# Patient Record
Sex: FEMALE | Race: WHITE | NOT HISPANIC OR LATINO | Employment: FULL TIME | URBAN - NONMETROPOLITAN AREA
[De-identification: names, ages, dates, MRNs, and addresses within clinical notes are randomized per-mention and may not be internally consistent; named-entity substitution may affect disease eponyms.]

---

## 2017-04-17 LAB
EXTERNAL ABO GROUPING: NORMAL
EXTERNAL ANTIBODY SCREEN: NEGATIVE
EXTERNAL HEPATITIS B SURFACE ANTIGEN: NEGATIVE
EXTERNAL RH FACTOR: POSITIVE
EXTERNAL RUBELLA QUALITATIVE: NORMAL
EXTERNAL SYPHILIS RPR SCREEN: NORMAL
HIV1 P24 AG SERPL QL IA: NORMAL

## 2017-11-13 LAB — EXTERNAL GROUP B STREP ANTIGEN: NORMAL

## 2017-12-11 RX ORDER — ACETAMINOPHEN 325 MG/1
650 TABLET ORAL EVERY 4 HOURS PRN
Status: CANCELLED | OUTPATIENT
Start: 2017-12-11

## 2017-12-11 RX ORDER — ZOLPIDEM TARTRATE 5 MG/1
5 TABLET ORAL NIGHTLY PRN
Status: CANCELLED | OUTPATIENT
Start: 2017-12-11 | End: 2017-12-21

## 2017-12-11 RX ORDER — LIDOCAINE HYDROCHLORIDE 10 MG/ML
5 INJECTION, SOLUTION INFILTRATION; PERINEURAL AS NEEDED
Status: CANCELLED | OUTPATIENT
Start: 2017-12-11

## 2017-12-11 RX ORDER — SODIUM CHLORIDE 0.9 % (FLUSH) 0.9 %
1-10 SYRINGE (ML) INJECTION AS NEEDED
Status: CANCELLED | OUTPATIENT
Start: 2017-12-11

## 2017-12-12 ENCOUNTER — ANESTHESIA EVENT (OUTPATIENT)
Dept: LABOR AND DELIVERY | Facility: HOSPITAL | Age: 32
End: 2017-12-12

## 2017-12-12 ENCOUNTER — HOSPITAL ENCOUNTER (INPATIENT)
Facility: HOSPITAL | Age: 32
LOS: 2 days | Discharge: HOME | End: 2017-12-14
Attending: OBSTETRICS & GYNECOLOGY | Admitting: OBSTETRICS & GYNECOLOGY

## 2017-12-12 ENCOUNTER — ANESTHESIA (OUTPATIENT)
Dept: LABOR AND DELIVERY | Facility: HOSPITAL | Age: 32
End: 2017-12-12

## 2017-12-12 LAB
ABO GROUP BLD: NORMAL
BLD GP AB SCN SERPL QL: NEGATIVE
DEPRECATED RDW RBC AUTO: 45.9 FL (ref 40–54)
ERYTHROCYTE [DISTWIDTH] IN BLOOD BY AUTOMATED COUNT: 14.1 % (ref 12–15)
HCT VFR BLD AUTO: 35.7 % (ref 37–47)
HGB BLD-MCNC: 12 G/DL (ref 12–16)
MCH RBC QN AUTO: 30 PG (ref 28–32)
MCHC RBC AUTO-ENTMCNC: 33.6 G/DL (ref 33–36)
MCV RBC AUTO: 89.3 FL (ref 82–98)
PLATELET # BLD AUTO: 174 10*3/MM3 (ref 130–400)
PMV BLD AUTO: 10.9 FL (ref 6–12)
RBC # BLD AUTO: 4 10*6/MM3 (ref 4.2–5.4)
RH BLD: POSITIVE
WBC NRBC COR # BLD: 11.78 10*3/MM3 (ref 4.8–10.8)

## 2017-12-12 PROCEDURE — 86900 BLOOD TYPING SEROLOGIC ABO: CPT | Performed by: OBSTETRICS & GYNECOLOGY

## 2017-12-12 PROCEDURE — 86850 RBC ANTIBODY SCREEN: CPT | Performed by: OBSTETRICS & GYNECOLOGY

## 2017-12-12 PROCEDURE — C1765 ADHESION BARRIER: HCPCS | Performed by: OBSTETRICS & GYNECOLOGY

## 2017-12-12 PROCEDURE — 25010000002 ONDANSETRON PER 1 MG: Performed by: NURSE ANESTHETIST, CERTIFIED REGISTERED

## 2017-12-12 PROCEDURE — 25010000002 HYDROMORPHONE PER 4 MG: Performed by: NURSE ANESTHETIST, CERTIFIED REGISTERED

## 2017-12-12 PROCEDURE — 63710000001 PROMETHAZINE PER 25 MG: Performed by: OBSTETRICS & GYNECOLOGY

## 2017-12-12 PROCEDURE — 51703 INSERT BLADDER CATH COMPLEX: CPT

## 2017-12-12 PROCEDURE — 0UN90ZZ RELEASE UTERUS, OPEN APPROACH: ICD-10-PCS | Performed by: OBSTETRICS & GYNECOLOGY

## 2017-12-12 PROCEDURE — 86901 BLOOD TYPING SEROLOGIC RH(D): CPT | Performed by: OBSTETRICS & GYNECOLOGY

## 2017-12-12 PROCEDURE — 25010000002 DEXAMETHASONE PER 1 MG: Performed by: NURSE ANESTHETIST, CERTIFIED REGISTERED

## 2017-12-12 PROCEDURE — 25010000002 FUROSEMIDE PER 20 MG: Performed by: OBSTETRICS & GYNECOLOGY

## 2017-12-12 PROCEDURE — 25010000002 KETOROLAC TROMETHAMINE PER 15 MG: Performed by: OBSTETRICS & GYNECOLOGY

## 2017-12-12 PROCEDURE — 25010000003 CEFAZOLIN IN DEXTROSE 2-4 GM/100ML-% SOLUTION: Performed by: OBSTETRICS & GYNECOLOGY

## 2017-12-12 PROCEDURE — 94799 UNLISTED PULMONARY SVC/PX: CPT

## 2017-12-12 PROCEDURE — 85027 COMPLETE CBC AUTOMATED: CPT | Performed by: OBSTETRICS & GYNECOLOGY

## 2017-12-12 RX ORDER — TRISODIUM CITRATE DIHYDRATE AND CITRIC ACID MONOHYDRATE 500; 334 MG/5ML; MG/5ML
30 SOLUTION ORAL ONCE
Status: COMPLETED | OUTPATIENT
Start: 2017-12-12 | End: 2017-12-12

## 2017-12-12 RX ORDER — PROMETHAZINE HYDROCHLORIDE 25 MG/ML
12.5 INJECTION, SOLUTION INTRAMUSCULAR; INTRAVENOUS EVERY 6 HOURS PRN
Status: DISCONTINUED | OUTPATIENT
Start: 2017-12-12 | End: 2017-12-14 | Stop reason: HOSPADM

## 2017-12-12 RX ORDER — PROMETHAZINE HYDROCHLORIDE 12.5 MG/1
12.5 SUPPOSITORY RECTAL EVERY 6 HOURS PRN
Status: DISCONTINUED | OUTPATIENT
Start: 2017-12-12 | End: 2017-12-14 | Stop reason: HOSPADM

## 2017-12-12 RX ORDER — CALCIUM CARBONATE 200(500)MG
2 TABLET,CHEWABLE ORAL EVERY 6 HOURS PRN
Status: DISCONTINUED | OUTPATIENT
Start: 2017-12-12 | End: 2017-12-14 | Stop reason: HOSPADM

## 2017-12-12 RX ORDER — ACETAMINOPHEN 325 MG/1
650 TABLET ORAL EVERY 4 HOURS PRN
Status: DISCONTINUED | OUTPATIENT
Start: 2017-12-12 | End: 2017-12-14 | Stop reason: HOSPADM

## 2017-12-12 RX ORDER — IBUPROFEN 200 MG
600 TABLET ORAL EVERY 8 HOURS PRN
Status: DISCONTINUED | OUTPATIENT
Start: 2017-12-12 | End: 2017-12-13

## 2017-12-12 RX ORDER — METHYLERGONOVINE MALEATE 0.2 MG/ML
200 INJECTION INTRAVENOUS ONCE
Status: DISCONTINUED | OUTPATIENT
Start: 2017-12-12 | End: 2017-12-13

## 2017-12-12 RX ORDER — ONDANSETRON 2 MG/ML
4 INJECTION INTRAMUSCULAR; INTRAVENOUS EVERY 6 HOURS PRN
Status: DISCONTINUED | OUTPATIENT
Start: 2017-12-12 | End: 2017-12-14 | Stop reason: HOSPADM

## 2017-12-12 RX ORDER — ZOLPIDEM TARTRATE 5 MG/1
5 TABLET ORAL NIGHTLY PRN
Status: DISCONTINUED | OUTPATIENT
Start: 2017-12-12 | End: 2017-12-12 | Stop reason: HOSPADM

## 2017-12-12 RX ORDER — FUROSEMIDE 10 MG/ML
40 INJECTION INTRAMUSCULAR; INTRAVENOUS ONCE
Status: COMPLETED | OUTPATIENT
Start: 2017-12-12 | End: 2017-12-12

## 2017-12-12 RX ORDER — CARBOPROST TROMETHAMINE 250 UG/ML
250 INJECTION, SOLUTION INTRAMUSCULAR ONCE
Status: DISCONTINUED | OUTPATIENT
Start: 2017-12-12 | End: 2017-12-13

## 2017-12-12 RX ORDER — OXYCODONE AND ACETAMINOPHEN 7.5; 325 MG/1; MG/1
2 TABLET ORAL EVERY 4 HOURS PRN
Status: DISCONTINUED | OUTPATIENT
Start: 2017-12-12 | End: 2017-12-14 | Stop reason: HOSPADM

## 2017-12-12 RX ORDER — OXYCODONE HYDROCHLORIDE AND ACETAMINOPHEN 5; 325 MG/1; MG/1
1 TABLET ORAL EVERY 4 HOURS PRN
Status: DISCONTINUED | OUTPATIENT
Start: 2017-12-13 | End: 2017-12-13

## 2017-12-12 RX ORDER — LIDOCAINE HYDROCHLORIDE 10 MG/ML
5 INJECTION, SOLUTION INFILTRATION; PERINEURAL AS NEEDED
Status: DISCONTINUED | OUTPATIENT
Start: 2017-12-12 | End: 2017-12-12 | Stop reason: HOSPADM

## 2017-12-12 RX ORDER — SODIUM CHLORIDE, SODIUM LACTATE, POTASSIUM CHLORIDE, CALCIUM CHLORIDE 600; 310; 30; 20 MG/100ML; MG/100ML; MG/100ML; MG/100ML
125 INJECTION, SOLUTION INTRAVENOUS CONTINUOUS
Status: DISCONTINUED | OUTPATIENT
Start: 2017-12-12 | End: 2017-12-12 | Stop reason: SDUPTHER

## 2017-12-12 RX ORDER — SODIUM CHLORIDE, SODIUM LACTATE, POTASSIUM CHLORIDE, CALCIUM CHLORIDE 600; 310; 30; 20 MG/100ML; MG/100ML; MG/100ML; MG/100ML
125 INJECTION, SOLUTION INTRAVENOUS CONTINUOUS
Status: DISCONTINUED | OUTPATIENT
Start: 2017-12-12 | End: 2017-12-13

## 2017-12-12 RX ORDER — PROMETHAZINE HYDROCHLORIDE 25 MG/1
25 TABLET ORAL EVERY 6 HOURS PRN
Status: DISCONTINUED | OUTPATIENT
Start: 2017-12-12 | End: 2017-12-14 | Stop reason: HOSPADM

## 2017-12-12 RX ORDER — SIMETHICONE 80 MG
80 TABLET,CHEWABLE ORAL 4 TIMES DAILY PRN
Status: DISCONTINUED | OUTPATIENT
Start: 2017-12-12 | End: 2017-12-14 | Stop reason: HOSPADM

## 2017-12-12 RX ORDER — EPHEDRINE SULFATE 50 MG/ML
5 INJECTION, SOLUTION INTRAVENOUS
Status: DISCONTINUED | OUTPATIENT
Start: 2017-12-12 | End: 2017-12-12 | Stop reason: HOSPADM

## 2017-12-12 RX ORDER — DEXAMETHASONE SODIUM PHOSPHATE 4 MG/ML
INJECTION, SOLUTION INTRA-ARTICULAR; INTRALESIONAL; INTRAMUSCULAR; INTRAVENOUS; SOFT TISSUE AS NEEDED
Status: DISCONTINUED | OUTPATIENT
Start: 2017-12-12 | End: 2017-12-12 | Stop reason: SURG

## 2017-12-12 RX ORDER — KETOROLAC TROMETHAMINE 30 MG/ML
30 INJECTION, SOLUTION INTRAMUSCULAR; INTRAVENOUS EVERY 6 HOURS PRN
Status: DISPENSED | OUTPATIENT
Start: 2017-12-12 | End: 2017-12-13

## 2017-12-12 RX ORDER — OXYCODONE AND ACETAMINOPHEN 10; 325 MG/1; MG/1
1 TABLET ORAL EVERY 4 HOURS PRN
Status: DISCONTINUED | OUTPATIENT
Start: 2017-12-13 | End: 2017-12-13

## 2017-12-12 RX ORDER — HYDROMORPHONE HCL 110MG/55ML
2 PATIENT CONTROLLED ANALGESIA SYRINGE INTRAVENOUS EVERY 4 HOURS PRN
Status: DISCONTINUED | OUTPATIENT
Start: 2017-12-13 | End: 2017-12-14 | Stop reason: HOSPADM

## 2017-12-12 RX ORDER — DIPHENHYDRAMINE HCL 25 MG
25 CAPSULE ORAL EVERY 4 HOURS PRN
Status: DISCONTINUED | OUTPATIENT
Start: 2017-12-12 | End: 2017-12-14 | Stop reason: HOSPADM

## 2017-12-12 RX ORDER — ONDANSETRON 2 MG/ML
INJECTION INTRAMUSCULAR; INTRAVENOUS AS NEEDED
Status: DISCONTINUED | OUTPATIENT
Start: 2017-12-12 | End: 2017-12-12 | Stop reason: SURG

## 2017-12-12 RX ORDER — FAMOTIDINE 10 MG/ML
20 INJECTION, SOLUTION INTRAVENOUS ONCE AS NEEDED
Status: COMPLETED | OUTPATIENT
Start: 2017-12-12 | End: 2017-12-12

## 2017-12-12 RX ORDER — MISOPROSTOL 200 UG/1
600 TABLET ORAL ONCE
Status: DISCONTINUED | OUTPATIENT
Start: 2017-12-12 | End: 2017-12-13

## 2017-12-12 RX ORDER — NALBUPHINE HCL 10 MG/ML
2.5 AMPUL (ML) INJECTION EVERY 4 HOURS PRN
Status: ACTIVE | OUTPATIENT
Start: 2017-12-12 | End: 2017-12-13

## 2017-12-12 RX ORDER — PRENATAL VIT/IRON FUM/FOLIC AC 27MG-0.8MG
1 TABLET ORAL DAILY
Status: DISCONTINUED | OUTPATIENT
Start: 2017-12-12 | End: 2017-12-14 | Stop reason: HOSPADM

## 2017-12-12 RX ORDER — CEFAZOLIN SODIUM 2 G/100ML
2 INJECTION, SOLUTION INTRAVENOUS ONCE
Status: COMPLETED | OUTPATIENT
Start: 2017-12-12 | End: 2017-12-12

## 2017-12-12 RX ORDER — OXYTOCIN/RINGER'S LACTATE 20/1000 ML
999 PLASTIC BAG, INJECTION (ML) INTRAVENOUS CONTINUOUS
Status: DISPENSED | OUTPATIENT
Start: 2017-12-12 | End: 2017-12-12

## 2017-12-12 RX ORDER — DOCUSATE SODIUM 100 MG/1
100 CAPSULE, LIQUID FILLED ORAL 2 TIMES DAILY PRN
Status: DISCONTINUED | OUTPATIENT
Start: 2017-12-12 | End: 2017-12-14 | Stop reason: HOSPADM

## 2017-12-12 RX ORDER — ACETAMINOPHEN 325 MG/1
650 TABLET ORAL EVERY 4 HOURS PRN
Status: DISCONTINUED | OUTPATIENT
Start: 2017-12-12 | End: 2017-12-12 | Stop reason: HOSPADM

## 2017-12-12 RX ORDER — IBUPROFEN 800 MG/1
800 TABLET ORAL EVERY 6 HOURS PRN
Status: DISCONTINUED | OUTPATIENT
Start: 2017-12-12 | End: 2017-12-14 | Stop reason: HOSPADM

## 2017-12-12 RX ORDER — SODIUM CHLORIDE 0.9 % (FLUSH) 0.9 %
1-10 SYRINGE (ML) INJECTION AS NEEDED
Status: DISCONTINUED | OUTPATIENT
Start: 2017-12-12 | End: 2017-12-12 | Stop reason: HOSPADM

## 2017-12-12 RX ORDER — BUPIVACAINE HYDROCHLORIDE 7.5 MG/ML
INJECTION, SOLUTION EPIDURAL; RETROBULBAR AS NEEDED
Status: DISCONTINUED | OUTPATIENT
Start: 2017-12-12 | End: 2017-12-12 | Stop reason: SURG

## 2017-12-12 RX ORDER — PRENATAL VIT NO.126/IRON/FOLIC 28MG-0.8MG
TABLET ORAL DAILY
COMMUNITY
End: 2020-03-10

## 2017-12-12 RX ORDER — ONDANSETRON 4 MG/1
4 TABLET, FILM COATED ORAL EVERY 8 HOURS PRN
Status: DISCONTINUED | OUTPATIENT
Start: 2017-12-12 | End: 2017-12-14 | Stop reason: HOSPADM

## 2017-12-12 RX ORDER — NALOXONE HCL 0.4 MG/ML
0.4 VIAL (ML) INJECTION
Status: ACTIVE | OUTPATIENT
Start: 2017-12-12 | End: 2017-12-13

## 2017-12-12 RX ORDER — CYCLOBENZAPRINE HCL 10 MG
10 TABLET ORAL 3 TIMES DAILY
Status: DISCONTINUED | OUTPATIENT
Start: 2017-12-12 | End: 2017-12-14 | Stop reason: HOSPADM

## 2017-12-12 RX ORDER — OXYCODONE AND ACETAMINOPHEN 7.5; 325 MG/1; MG/1
1 TABLET ORAL EVERY 4 HOURS PRN
Status: DISCONTINUED | OUTPATIENT
Start: 2017-12-12 | End: 2017-12-14 | Stop reason: HOSPADM

## 2017-12-12 RX ORDER — NALOXONE HCL 0.4 MG/ML
0.1 VIAL (ML) INJECTION
Status: DISCONTINUED | OUTPATIENT
Start: 2017-12-12 | End: 2017-12-14 | Stop reason: HOSPADM

## 2017-12-12 RX ORDER — BUTORPHANOL TARTRATE 1 MG/ML
0.5 INJECTION, SOLUTION INTRAMUSCULAR; INTRAVENOUS EVERY 6 HOURS PRN
Status: DISCONTINUED | OUTPATIENT
Start: 2017-12-12 | End: 2017-12-13

## 2017-12-12 RX ADMIN — CYCLOBENZAPRINE HYDROCHLORIDE 10 MG: 10 TABLET, FILM COATED ORAL at 21:19

## 2017-12-12 RX ADMIN — SODIUM CHLORIDE, POTASSIUM CHLORIDE, SODIUM LACTATE AND CALCIUM CHLORIDE 125 ML/HR: 600; 310; 30; 20 INJECTION, SOLUTION INTRAVENOUS at 14:34

## 2017-12-12 RX ADMIN — OXYTOCIN: 10 INJECTION INTRAVENOUS at 10:56

## 2017-12-12 RX ADMIN — HYDROMORPHONE HYDROCHLORIDE 100 MCG: 1 INJECTION, SOLUTION INTRAMUSCULAR; INTRAVENOUS; SUBCUTANEOUS at 10:07

## 2017-12-12 RX ADMIN — SODIUM CHLORIDE, POTASSIUM CHLORIDE, SODIUM LACTATE AND CALCIUM CHLORIDE: 600; 310; 30; 20 INJECTION, SOLUTION INTRAVENOUS at 10:33

## 2017-12-12 RX ADMIN — BUPIVACAINE HYDROCHLORIDE 1.6 ML: 7.5 INJECTION, SOLUTION EPIDURAL; RETROBULBAR at 10:07

## 2017-12-12 RX ADMIN — SODIUM CITRATE AND CITRIC ACID MONOHYDRATE 30 ML: 500; 334 SOLUTION ORAL at 09:54

## 2017-12-12 RX ADMIN — KETOROLAC TROMETHAMINE 30 MG: 30 INJECTION, SOLUTION INTRAMUSCULAR at 21:19

## 2017-12-12 RX ADMIN — SODIUM CHLORIDE, POTASSIUM CHLORIDE, SODIUM LACTATE AND CALCIUM CHLORIDE: 600; 310; 30; 20 INJECTION, SOLUTION INTRAVENOUS at 11:04

## 2017-12-12 RX ADMIN — OXYTOCIN 0.75 UNITS/MIN: 10 INJECTION INTRAVENOUS at 10:33

## 2017-12-12 RX ADMIN — FUROSEMIDE 40 MG: 10 INJECTION, SOLUTION INTRAMUSCULAR; INTRAVENOUS at 21:20

## 2017-12-12 RX ADMIN — SODIUM CHLORIDE, POTASSIUM CHLORIDE, SODIUM LACTATE AND CALCIUM CHLORIDE 1000 ML: 600; 310; 30; 20 INJECTION, SOLUTION INTRAVENOUS at 07:28

## 2017-12-12 RX ADMIN — FAMOTIDINE 20 MG: 10 INJECTION INTRAVENOUS at 09:53

## 2017-12-12 RX ADMIN — ONDANSETRON HYDROCHLORIDE 8 MG: 2 SOLUTION INTRAMUSCULAR; INTRAVENOUS at 10:10

## 2017-12-12 RX ADMIN — CEFAZOLIN SODIUM 2 G: 2 INJECTION, SOLUTION INTRAVENOUS at 10:10

## 2017-12-12 RX ADMIN — SODIUM CHLORIDE, POTASSIUM CHLORIDE, SODIUM LACTATE AND CALCIUM CHLORIDE 125 ML/HR: 600; 310; 30; 20 INJECTION, SOLUTION INTRAVENOUS at 22:30

## 2017-12-12 RX ADMIN — DEXAMETHASONE SODIUM PHOSPHATE 8 MG: 4 INJECTION, SOLUTION INTRAMUSCULAR; INTRAVENOUS at 10:10

## 2017-12-12 RX ADMIN — SODIUM CHLORIDE, POTASSIUM CHLORIDE, SODIUM LACTATE AND CALCIUM CHLORIDE 125 ML/HR: 600; 310; 30; 20 INJECTION, SOLUTION INTRAVENOUS at 07:57

## 2017-12-12 RX ADMIN — PROMETHAZINE HYDROCHLORIDE 25 MG: 25 TABLET ORAL at 14:34

## 2017-12-12 NOTE — H&P (VIEW-ONLY)
"      Patient Care Team:  Marcello Larson MD as PCP - General (Family Medicine)          History of Present Illness   Patient presents at term gestation for repeat C/S      Review of Systems   Constitutional: Negative.    HENT: Negative.    Eyes: Negative.    Respiratory: Negative.    Cardiovascular: Negative.    Gastrointestinal: Negative.    Endocrine: Negative.    Genitourinary: Negative.    Musculoskeletal: Negative.    Skin: Negative.    Allergic/Immunologic: Negative.    Neurological: Negative.    Hematological: Negative.    Psychiatric/Behavioral: Negative.         No past medical history on file.  Past Surgical History:   Procedure Laterality Date   •  SECTION     • FOOT SURGERY      left     No family history on file.  Social History   Substance Use Topics   • Smoking status: Never Smoker   • Smokeless tobacco: Not on file   • Alcohol use Yes      Comment: occ   Family:  HTN  Meds:  PNV    (Not in a hospital admission)  Allergies:  Review of patient's allergies indicates no known allergies.    Objective      Vital Signs  BP: 130/80  P:  80  R:  15  Ht:  66\"  Wt:  181    Physical Exam   Constitutional: She is oriented to person, place, and time. She appears well-developed and well-nourished.   HENT:   Head: Normocephalic.   Eyes: EOM are normal.   Neck: Normal range of motion. Neck supple.   Cardiovascular: Normal rate, regular rhythm and intact distal pulses.    Pulmonary/Chest: Effort normal and breath sounds normal.   Abdominal: Soft. Bowel sounds are normal.   Genitourinary: Vagina normal.   Genitourinary Comments: S=D  Bimanual:  C/thick/-3     Musculoskeletal: Normal range of motion. She exhibits edema.   Neurological: She is alert and oriented to person, place, and time. She has normal reflexes.   Skin: Skin is warm.   Psychiatric: She has a normal mood and affect. Her behavior is normal.   Vitals reviewed.        Assessment & Plan   Assessment:  1.  Term pregnancy  2.  History Prior " C/S    Plan:  1.  Repeat C/S      Kelly Melédnez DO  12/11/17  6:16 PM

## 2017-12-12 NOTE — PLAN OF CARE
Problem: Patient Care Overview (Adult)  Goal: Plan of Care Review  Outcome: Ongoing (interventions implemented as appropriate)    17 1311   Coping/Psychosocial Response Interventions   Plan Of Care Reviewed With patient   Patient Care Overview   Progress improving   Outcome Evaluation   Outcome Summary/Follow up Plan pt now  with 3 LC. pt incision is CDI. pt can move legs and has pain 1/10. pt has minimal bleeding       Goal: Adult Individualization and Mutuality  Outcome: Ongoing (interventions implemented as appropriate)  Goal: Discharge Needs Assessment  Outcome: Ongoing (interventions implemented as appropriate)    Problem: Postpartum ( Delivery) (Adult)  Goal: Signs and Symptoms of Listed Potential Problems Will be Absent or Manageable (Postpartum)  Outcome: Ongoing (interventions implemented as appropriate)    Problem: Anesthesia/Analgesia, Neuraxial (Obstetrics)  Goal: Signs and Symptoms of Listed Potential Problems Will be Absent or Manageable (Anesthesia/Analgesia, Neuraxial)  Outcome: Ongoing (interventions implemented as appropriate)

## 2017-12-12 NOTE — LACTATION NOTE
"This note was copied from a baby's chart.  40 1/7 week infant delivered 17 @ 1033. Birth weight 7 lb 12.5 oz (3530 g). Mother  her twins and states this baby is \"a natural.\" He latched on independently and is actively nursing without assistance. Reviewed initial breastfeeding packet and mother became nauseated. Father at bedside and supportive. Breastfeeding book given but not reviewed at this time. Mother states she has a new pump from insurance.     Maternal Hx: , C-sec  Maternal Meds: PNV  Pump: New double electric breast pump  "

## 2017-12-12 NOTE — ANESTHESIA PROCEDURE NOTES
Spinal Block    Patient location during procedure: OR  Start Time: 12/12/2017 10:07 AM  Stop Time: 12/12/2017 10:07 AM  Indication:at surgeon's request  Performed By  CRNA: TYESHA EM  Preanesthetic Checklist  Completed: patient identified, site marked, surgical consent, pre-op evaluation, timeout performed, IV checked, risks and benefits discussed and monitors and equipment checked  Spinal Block Prep:  Patient Position:sitting  Sterile Tech:cap, gloves and mask  Prep:Chloraprep  Patient Monitoring:blood pressure monitoring, continuous pulse oximetry and EKG  Spinal Block Procedure  Approach:midline  Location:L4-L5  Needle Type:Pencan  Needle Gauge:25 G  Placement of Spinal needle event:cerebrospinal fluid aspirated    Fluid Appearance:clear  Post Assessment  Patient Tolerance:patient tolerated the procedure well with no apparent complications  Complications no

## 2017-12-12 NOTE — PLAN OF CARE
Problem: Patient Care Overview (Adult)  Goal: Plan of Care Review  Outcome: Ongoing (interventions implemented as appropriate)    17 3121   Coping/Psychosocial Response Interventions   Plan Of Care Reviewed With patient   Patient Care Overview   Progress improving   Outcome Evaluation   Outcome Summary/Follow up Plan Fundus FML U1 scant lochia, Incision dry and intact with adhesive skin glue, FC draining yellow urine, Rates pain a 3.        Goal: Adult Individualization and Mutuality  Outcome: Ongoing (interventions implemented as appropriate)  Goal: Discharge Needs Assessment  Outcome: Ongoing (interventions implemented as appropriate)    Problem: Postpartum ( Delivery) (Adult)  Goal: Signs and Symptoms of Listed Potential Problems Will be Absent or Manageable (Postpartum)  Outcome: Ongoing (interventions implemented as appropriate)    Problem: Anesthesia/Analgesia, Neuraxial (Obstetrics)  Goal: Signs and Symptoms of Listed Potential Problems Will be Absent or Manageable (Anesthesia/Analgesia, Neuraxial)  Outcome: Ongoing (interventions implemented as appropriate)

## 2017-12-12 NOTE — H&P
"      Patient Care Team:  Marcello Larson MD as PCP - General (Family Medicine)          History of Present Illness   Patient presents at term gestation for repeat C/S      Review of Systems   Constitutional: Negative.    HENT: Negative.    Eyes: Negative.    Respiratory: Negative.    Cardiovascular: Negative.    Gastrointestinal: Negative.    Endocrine: Negative.    Genitourinary: Negative.    Musculoskeletal: Negative.    Skin: Negative.    Allergic/Immunologic: Negative.    Neurological: Negative.    Hematological: Negative.    Psychiatric/Behavioral: Negative.         No past medical history on file.  Past Surgical History:   Procedure Laterality Date   •  SECTION     • FOOT SURGERY      left     No family history on file.  Social History   Substance Use Topics   • Smoking status: Never Smoker   • Smokeless tobacco: Not on file   • Alcohol use Yes      Comment: occ   Family:  HTN  Meds:  PNV    (Not in a hospital admission)  Allergies:  Review of patient's allergies indicates no known allergies.    Objective      Vital Signs  BP: 130/80  P:  80  R:  15  Ht:  66\"  Wt:  181    Physical Exam   Constitutional: She is oriented to person, place, and time. She appears well-developed and well-nourished.   HENT:   Head: Normocephalic.   Eyes: EOM are normal.   Neck: Normal range of motion. Neck supple.   Cardiovascular: Normal rate, regular rhythm and intact distal pulses.    Pulmonary/Chest: Effort normal and breath sounds normal.   Abdominal: Soft. Bowel sounds are normal.   Genitourinary: Vagina normal.   Genitourinary Comments: S=D  Bimanual:  C/thick/-3     Musculoskeletal: Normal range of motion. She exhibits edema.   Neurological: She is alert and oriented to person, place, and time. She has normal reflexes.   Skin: Skin is warm.   Psychiatric: She has a normal mood and affect. Her behavior is normal.   Vitals reviewed.        Assessment & Plan   Assessment:  1.  Term pregnancy  2.  History Prior " C/S    Plan:  1.  Repeat C/S      Kelly Meléndez DO  12/11/17  6:16 PM

## 2017-12-12 NOTE — ANESTHESIA PREPROCEDURE EVALUATION
Anesthesia Evaluation     Patient summary reviewed   NPO Solid Status: > 8 hours  NPO Liquid Status: > 8 hours     Airway   Mallampati: II  TM distance: >3 FB  Neck ROM: full  no difficulty expected  Dental - normal exam     Pulmonary - negative pulmonary ROS and normal exam   Cardiovascular - negative cardio ROS and normal exam  Exercise tolerance: excellent (>7 METS)        Neuro/Psych- negative ROS  GI/Hepatic/Renal/Endo - negative ROS     Musculoskeletal (-) negative ROS    Abdominal  - normal exam   Substance History      OB/GYN          Other                                        Anesthesia Plan    ASA 2     spinal     Anesthetic plan and risks discussed with patient.  Use of blood products discussed with patient .

## 2017-12-12 NOTE — OP NOTE
Operative Note  Luda Nguyen    Pre Op Dx:   1.  Intrauterine pregnancy at 40+ weeks gestation  2.  History of prior  section requests repeat    Post Op Dx:  1.  Same  2.  PAD    Procedure:  1.  Repeat  section  2.  Lysis of adhesions    Surgeon:  Medhat  Anesthesia:  Dilaudid spinal  EBL:  1000 cc  IVF:  1600  Cc  Urine:  200 cc clear urine  Pathology:  None  Complications:  None    Indications:  This is a 32 year old female at 40+ weeks gestation with history of   Prior  delivery who desires repeat  section at this  Time.    Findings:  Male infant, 7-13 lbs, apgars 8/9  Nuchal and left hand cord  Clear amniotic fluid  Dense PAD, unable to deliver uterus out of the pelvis for repair  Unable to visualize tubes or ovaries    Procedure:  Patient was taken to the OR where she received spinal anesthesia.  She was placed in dorsal supine position with a leftward tilt.  Dawson  Catheter was placed and noted to be draining clear urine.  Patient was prepped and draped with a procedural time out was performed.    Using the knife prior cicatrix was excised and passed off the field.  The incision  Was extended sharply to the rectus fascia which was nicked in the midline and  Extended laterally.  Clamps were placed on the rectus fascia, which was sharply  Dissected off the rectus muscle.  Rectus muscle was then sharply  in the  Midline.  Extended time was required to safely enter the abdominal cavity.  Bladder  Appeared to be densely adherent to the lower uterine segment as well as the peritoneum.  Sharp dissection was done to free the bladder to allow safe placement of the bladder  Blade.    A transverse hysterotomy incision was made, along the upper limits of the lower   Uterine segment.  This incision was bluntly extended laterally- all along ensure a safe  Operating field from the bladder.  The bladder blade was removed and the infants head  Was delivered with nose and mouth  suctioned.  Nuchal cord was reduced as well as  Removal of the portion of the cord in the infants left hand.    Cord was clamped and cut.  Infant was handed off to awaiting nursery nurses and Neonatologist.  Placenta was manually removed and passed off the operating field.  Due to dense adhesions uterine  Repair was performed intra abdominally.  The hysterotomy incision was closed in two layers with O-vicryl.  First in a running locked fashion, then further embricating the incision with hemostasis achieved.  Jillian was further applied, prior to placement of a 4 x 4 piece of interceed which was laid across the  Incisions in hopes to prevent further pelvic adhesions.      The muscle was loosely re approximated with O-vicryl.  Due to palpation of what appeared to be the  Alexander bulb, the lower rectus muscle was loosened and proper placement of the alexander bulb was performed  From below with the circulating nurse.  Alexander remained clear, throughout the entire surgery.    Additional piece of interceed was laid on the anterior surface of the rectus muscle, and all remaining jillian  Was applied to the exposed rectus muscle.  The fascia was closed with O-vicryl in a running manner.  O-plain was used to re approximate the SQ tissue.  The skin was closed with O-monocryl.    Sponge needle and instrument count was correct times two.  Patient was taken to the recovery room awake  And in stable condition, where spouse and baby were awaiting her arrival.

## 2017-12-13 LAB
HCT VFR BLD AUTO: 26.8 % (ref 37–47)
HGB BLD-MCNC: 9 G/DL (ref 12–16)

## 2017-12-13 PROCEDURE — 85018 HEMOGLOBIN: CPT | Performed by: OBSTETRICS & GYNECOLOGY

## 2017-12-13 PROCEDURE — 25010000002 IRON SUCROSE PER 1 MG: Performed by: OBSTETRICS & GYNECOLOGY

## 2017-12-13 PROCEDURE — 94799 UNLISTED PULMONARY SVC/PX: CPT

## 2017-12-13 PROCEDURE — 85014 HEMATOCRIT: CPT | Performed by: OBSTETRICS & GYNECOLOGY

## 2017-12-13 RX ORDER — FERROUS GLUCONATE 324(37.5)
324 TABLET ORAL
Status: DISCONTINUED | OUTPATIENT
Start: 2017-12-13 | End: 2017-12-14 | Stop reason: HOSPADM

## 2017-12-13 RX ADMIN — FERROUS GLUCONATE TAB 324 MG (37.5 MG ELEMENTAL IRON) 324 MG: 324 (37.5 FE) TAB at 09:36

## 2017-12-13 RX ADMIN — OXYCODONE HYDROCHLORIDE AND ACETAMINOPHEN 2 TABLET: 7.5; 325 TABLET ORAL at 18:30

## 2017-12-13 RX ADMIN — CYCLOBENZAPRINE HYDROCHLORIDE 10 MG: 10 TABLET, FILM COATED ORAL at 23:44

## 2017-12-13 RX ADMIN — IBUPROFEN 800 MG: 800 TABLET, FILM COATED ORAL at 18:26

## 2017-12-13 RX ADMIN — FERROUS GLUCONATE TAB 324 MG (37.5 MG ELEMENTAL IRON) 324 MG: 324 (37.5 FE) TAB at 18:30

## 2017-12-13 RX ADMIN — PRENATAL VIT W/ FE FUMARATE-FA TAB 27-0.8 MG 1 TABLET: 27-0.8 TAB at 09:04

## 2017-12-13 RX ADMIN — SODIUM CHLORIDE, POTASSIUM CHLORIDE, SODIUM LACTATE AND CALCIUM CHLORIDE 125 ML/HR: 600; 310; 30; 20 INJECTION, SOLUTION INTRAVENOUS at 07:37

## 2017-12-13 RX ADMIN — FERROUS GLUCONATE TAB 324 MG (37.5 MG ELEMENTAL IRON) 324 MG: 324 (37.5 FE) TAB at 13:01

## 2017-12-13 RX ADMIN — OXYCODONE HYDROCHLORIDE AND ACETAMINOPHEN 1 TABLET: 7.5; 325 TABLET ORAL at 13:45

## 2017-12-13 RX ADMIN — IBUPROFEN 800 MG: 800 TABLET, FILM COATED ORAL at 09:36

## 2017-12-13 RX ADMIN — CYCLOBENZAPRINE HYDROCHLORIDE 10 MG: 10 TABLET, FILM COATED ORAL at 09:05

## 2017-12-13 RX ADMIN — DOCUSATE SODIUM 100 MG: 100 CAPSULE, LIQUID FILLED ORAL at 18:26

## 2017-12-13 RX ADMIN — OXYCODONE HYDROCHLORIDE AND ACETAMINOPHEN 1 TABLET: 7.5; 325 TABLET ORAL at 09:37

## 2017-12-13 RX ADMIN — CYCLOBENZAPRINE HYDROCHLORIDE 10 MG: 10 TABLET, FILM COATED ORAL at 17:00

## 2017-12-13 RX ADMIN — IRON SUCROSE 200 MG: 20 INJECTION, SOLUTION INTRAVENOUS at 09:37

## 2017-12-13 RX ADMIN — POLYETHYLENE GLYCOL 3350 17 G: 17 POWDER, FOR SOLUTION ORAL at 09:04

## 2017-12-13 NOTE — LACTATION NOTE
This note was copied from a baby's chart.  40 1/7 week infant delivered 17 @ 1033. Birth weight 7 lb 12.5 oz (3530 g). Current weight 7 lb 11.3 oz (3496g).  Weight loss -0.96%.  1 void, 7 stools, six breastfeeding sessions charted since birth.  Mother reports infant nursing well, at least every three hours.  Mother complains of pain when latching infant to left breast.  States infant will not stay latched on deeply to left side and she is less comfortable latching him to this side.  Infant awake, assisted mom to latch infant to left breast in cradle position.  Mom complaining of pain at first, with minor position adjustments to allow infant's head to tilt back and latching infant deeper pain subsided.  Encouraged mom to always latch infant as deep as possible and if latch is painful to un latch him and try again.  Verbalizes understanding.      Maternal Hx: , C-sec, breast fed twin girls successfully   Maternal Meds: PNV  Pump: New double electric breast pump    1545: Called to room to assess infant latch on right breast.  Mother states overall she feels infant is nursing well on this side, but wanted lactation to check his latch.  Infant rooting and smacking lips.  Minor adjustments made to mom's hold and positioning.  Infant latched without difficulty and nursing well. Swallows heard.

## 2017-12-13 NOTE — LACTATION NOTE
This note was copied from a baby's chart.  Observed infant at breast, infant latched in cradle position breastfeeding without difficulty. Deep latch noted, audible swallow heard, mom denies any questions or concerns at this time. Provided support and encouragement.

## 2017-12-13 NOTE — PROGRESS NOTES
[facility]   PROGRESS NOTE    Post-Op Day 1 S/P   Subjective     Patient reports:  Pain is controlled.  She is ambulating. Tolerating diet. Tolerating po -- normal.  Intake -- c/o of tolerating po solids.   Voiding - without difficulty; flatus reported..  Vaginal bleeding is as much as expected.      Objective      Vitals: Vital Signs Range for the last 24 hours  Temperature: Temp:  [96.7 °F (35.9 °C)-98.1 °F (36.7 °C)] 98.1 °F (36.7 °C)   Temp Source: Temp src: Oral   BP: BP: ()/(48-73) 93/52   Pulse: Heart Rate:  [64-81] 73   Respirations: Resp:  [16-18] 16   SPO2: SpO2:  [95 %-100 %] 97 %   O2 Amount (l/min):     O2 Devices O2 Device: room air   Weight:              Physical Exam    Lungs clear to auscultation bilaterally   Abdomen Soft, non-tender, normal bowel sounds; no bruits, organomegaly or masses.   Incision  healing well, no drainage, no erythema, no hernia, no seroma, no swelling, well approximated   Extremities SCD     I reviewed the patient's new clinical results.    Assessment:  1.  POD#1 RCS  2.  Anemia    Plan:   1.  Iron supplemen  2.  Routine post op care      Kelly Meléndez DO  2017  8:45 AM

## 2017-12-13 NOTE — ANESTHESIA POSTPROCEDURE EVALUATION
"Patient: Luda Nguyen    Procedure Summary     Date Anesthesia Start Anesthesia Stop Room / Location    17 1001 1119  [facility] DELIVERY 1 /  [facility] DELIVERY       Procedure Diagnosis Surgeon Provider     SECTION REPEAT (N/A Abdomen) (REPEAT  SECTION) DO Feroz Cleaning CRNA          Anesthesia Type: spinal  Last vitals  BP   92/53 (17)   Temp   97.7 °F (36.5 °C) (17)   Pulse   78 (17)   Resp   17 (17)     SpO2   98 % (17)     Post Anesthesia Care and Evaluation    Patient location during evaluation: floor  Patient participation: complete - patient participated  Level of consciousness: awake and alert  Pain score: 1  Pain management: adequate  Airway patency: patent  Anesthetic complications: No anesthetic complications    Cardiovascular status: acceptable  Respiratory status: room air  Hydration status: acceptable  Post Neuraxial Block status: Motor and sensory function returned to baseline and No signs or symptoms of PDPH  Blood pressure 92/53, pulse 78, temperature 97.7 °F (36.5 °C), temperature source Temporal Artery , resp. rate 17, height 167.6 cm (66\"), weight 81.3 kg (179 lb 3.2 oz), SpO2 98 %, currently breastfeeding.      "

## 2017-12-13 NOTE — PLAN OF CARE
Problem: Patient Care Overview (Adult)  Goal: Plan of Care Review  Outcome: Ongoing (interventions implemented as appropriate)    17 0621   Coping/Psychosocial Response Interventions   Plan Of Care Reviewed With patient   Patient Care Overview   Progress improving   Outcome Evaluation   Outcome Summary/Follow up Plan VSS, FF ML U1, scant lochia, Incision WDL, adhesive skin glue intact, abdominal binder on, breastfeeding well with no assistance       Goal: Adult Individualization and Mutuality  Outcome: Ongoing (interventions implemented as appropriate)  Goal: Discharge Needs Assessment  Outcome: Ongoing (interventions implemented as appropriate)    Problem: Postpartum ( Delivery) (Adult)  Goal: Signs and Symptoms of Listed Potential Problems Will be Absent or Manageable (Postpartum)  Outcome: Ongoing (interventions implemented as appropriate)

## 2017-12-14 VITALS
HEART RATE: 96 BPM | TEMPERATURE: 98 F | WEIGHT: 179.2 LBS | SYSTOLIC BLOOD PRESSURE: 95 MMHG | OXYGEN SATURATION: 95 % | DIASTOLIC BLOOD PRESSURE: 54 MMHG | RESPIRATION RATE: 17 BRPM | HEIGHT: 66 IN | BODY MASS INDEX: 28.8 KG/M2

## 2017-12-14 PROCEDURE — 25010000002 IRON SUCROSE PER 1 MG: Performed by: OBSTETRICS & GYNECOLOGY

## 2017-12-14 RX ORDER — OXYCODONE AND ACETAMINOPHEN 7.5; 325 MG/1; MG/1
2 TABLET ORAL EVERY 4 HOURS PRN
Qty: 50 TABLET | Refills: 0 | Status: SHIPPED | OUTPATIENT
Start: 2017-12-14 | End: 2017-12-22

## 2017-12-14 RX ORDER — IBUPROFEN 800 MG/1
800 TABLET ORAL EVERY 6 HOURS PRN
Qty: 50 TABLET | Refills: 2 | Status: SHIPPED | OUTPATIENT
Start: 2017-12-14 | End: 2018-01-13

## 2017-12-14 RX ORDER — ONDANSETRON 4 MG/1
4 TABLET, FILM COATED ORAL EVERY 8 HOURS PRN
Qty: 30 TABLET | Refills: 0 | Status: SHIPPED | OUTPATIENT
Start: 2017-12-14 | End: 2018-01-13

## 2017-12-14 RX ORDER — FERROUS GLUCONATE 324(37.5)
324 TABLET ORAL
Qty: 90 TABLET | Refills: 3 | Status: SHIPPED | OUTPATIENT
Start: 2017-12-14 | End: 2018-01-13

## 2017-12-14 RX ORDER — CYCLOBENZAPRINE HCL 10 MG
10 TABLET ORAL 3 TIMES DAILY PRN
Qty: 30 TABLET | Refills: 0 | Status: SHIPPED | OUTPATIENT
Start: 2017-12-14 | End: 2018-01-03

## 2017-12-14 RX ADMIN — FERROUS GLUCONATE TAB 324 MG (37.5 MG ELEMENTAL IRON) 324 MG: 324 (37.5 FE) TAB at 09:51

## 2017-12-14 RX ADMIN — OXYCODONE HYDROCHLORIDE AND ACETAMINOPHEN 1 TABLET: 7.5; 325 TABLET ORAL at 00:55

## 2017-12-14 RX ADMIN — FERROUS GLUCONATE TAB 324 MG (37.5 MG ELEMENTAL IRON) 324 MG: 324 (37.5 FE) TAB at 13:38

## 2017-12-14 RX ADMIN — IBUPROFEN 800 MG: 800 TABLET, FILM COATED ORAL at 00:54

## 2017-12-14 RX ADMIN — POLYETHYLENE GLYCOL 3350 17 G: 17 POWDER, FOR SOLUTION ORAL at 09:51

## 2017-12-14 RX ADMIN — IBUPROFEN 800 MG: 800 TABLET, FILM COATED ORAL at 13:00

## 2017-12-14 RX ADMIN — PRENATAL VIT W/ FE FUMARATE-FA TAB 27-0.8 MG 1 TABLET: 27-0.8 TAB at 09:51

## 2017-12-14 RX ADMIN — CYCLOBENZAPRINE HYDROCHLORIDE 10 MG: 10 TABLET, FILM COATED ORAL at 09:51

## 2017-12-14 RX ADMIN — IRON SUCROSE 200 MG: 20 INJECTION, SOLUTION INTRAVENOUS at 10:42

## 2017-12-14 RX ADMIN — OXYCODONE HYDROCHLORIDE AND ACETAMINOPHEN 2 TABLET: 7.5; 325 TABLET ORAL at 13:01

## 2017-12-14 NOTE — ANESTHESIA POSTPROCEDURE EVALUATION
Patient: Luda Nguyen    Procedure Summary     Date Anesthesia Start Anesthesia Stop Room / Location    17 1001 1119  [facility] DELIVERY 1 /  [facility]OR DELIVERY       Procedure Diagnosis Surgeon Provider     SECTION REPEAT (N/A Abdomen) (REPEAT  SECTION) DO Feroz Cleaning CRNA          Anesthesia Type: spinal  Last vitals  BP   95/54 (178)   Temp   97.6 °F (36.4 °C) (17)   Pulse   85 (17)   Resp   16 (17)     SpO2   97 % (17)     Post Anesthesia Care and Evaluation    Patient location during evaluation: bedside  Patient participation: complete - patient participated  Level of consciousness: awake and alert  Pain score: 0  Pain management: adequate  Airway patency: patent  Anesthetic complications: No anesthetic complications  PONV Status: none  Cardiovascular status: acceptable, hemodynamically stable and stable  Respiratory status: acceptable and room air  Hydration status: acceptable  Post Neuraxial Block status: Motor and sensory function returned to baseline and No signs or symptoms of PDPH

## 2017-12-14 NOTE — DISCHARGE SUMMARY
[facility]  Delivery Discharge Summary  Teresita Nguyen    Primary OB Clinician: Medhat    EDC: Estimated Date of Delivery: 12/11/17    Gestational Age:40w1d    Antepartum complications: none    Date of Delivery: 12/12/2017   Time of Delivery: 10:33 AM     Delivered By:  Kelly Meléndez     Delivery Type: Repeat C/S    Feeding method: Breastfeeding Status: Yes    Rh Immune globulin given: not applicable    Rubella vaccine given: not applicable    Discharge Date: 12/14/2017; Discharge Time: 8:46 AM    Early Discharge:  NO    Plan:    Address and phone number verified and same.  Follow-up appointment with Medhat on monday

## 2017-12-14 NOTE — PLAN OF CARE
Problem: Patient Care Overview (Adult)  Goal: Plan of Care Review  Outcome: Ongoing (interventions implemented as appropriate)  Tolerating pain with po pain medication. Had shower. Tolerated well. No emesis. Tolerated po iron and venefer. Hypoactive bowel sounds. Mild edema in feet. Ambulated in srivastava. Vital signs stable. Incision healing well. No redness, bleeding or drainage. No clots. Breastfeeding well. Bonding well. Still thinking about TDAP.  Goal: Adult Individualization and Mutuality  Outcome: Ongoing (interventions implemented as appropriate)  Goal: Discharge Needs Assessment  Outcome: Ongoing (interventions implemented as appropriate)    Problem: Postpartum ( Delivery) (Adult)  Goal: Signs and Symptoms of Listed Potential Problems Will be Absent or Manageable (Postpartum)  Outcome: Ongoing (interventions implemented as appropriate)    Problem: Breastfeeding (Adult,NICU,,Obstetrics,Pediatric)  Goal: Signs and Symptoms of Listed Potential Problems Will be Absent or Manageable (Breastfeeding)  Outcome: Ongoing (interventions implemented as appropriate)

## 2017-12-14 NOTE — PLAN OF CARE
Problem: Patient Care Overview (Adult)  Goal: Plan of Care Review  Outcome: Ongoing (interventions implemented as appropriate)    17 0424   Coping/Psychosocial Response Interventions   Plan Of Care Reviewed With patient   Patient Care Overview   Progress improving   Outcome Evaluation   Outcome Summary/Follow up Plan VSS, FFMLU1, scant lochia, incision WDL, skin adhesive intact, open to air, binder on; pain controlled with PO pain meds; breastfeeding well with no assistance during shift       Goal: Adult Individualization and Mutuality  Outcome: Ongoing (interventions implemented as appropriate)  Goal: Discharge Needs Assessment  Outcome: Ongoing (interventions implemented as appropriate)    Problem: Postpartum ( Delivery) (Adult)  Goal: Signs and Symptoms of Listed Potential Problems Will be Absent or Manageable (Postpartum)  Outcome: Ongoing (interventions implemented as appropriate)    Problem: Breastfeeding (Adult,NICU,,Obstetrics,Pediatric)  Goal: Signs and Symptoms of Listed Potential Problems Will be Absent or Manageable (Breastfeeding)  Outcome: Ongoing (interventions implemented as appropriate)

## 2017-12-20 ENCOUNTER — TELEPHONE (OUTPATIENT)
Dept: OTHER | Facility: HOSPITAL | Age: 32
End: 2017-12-20

## 2017-12-20 NOTE — TELEPHONE ENCOUNTER
Bibiana, Cruz, TOBY     Left msg regarding BFing status with offer of help line and outpatient services; also invited Akira to Johnny Avendaño.

## 2018-01-02 ENCOUNTER — HOSPITAL ENCOUNTER (OUTPATIENT)
Dept: LACTATION | Facility: HOSPITAL | Age: 33
Discharge: HOME | End: 2018-01-02

## 2018-01-02 NOTE — LACTATION NOTE
"Infant's Name: (Cruz)   \"Steve\" Veal                            Infant's Date of Birth: [birth year]     G/P  2/3    Gestational age at Birth: 40 wks 1 day   Infant's Age:3 wks  Infant's Physician: Dr. Omer                          Mom's Contact #: [phone]    Reason for Visit: milk transfer check       Maternal Assessment:           Nipples: (x  )Protractile (  ) Inverted  (   ) Flat   (   ) Traumatized            Breast:   (  )Elastic     (  x ) Inelastic   ( x ) Soft Breasts   (   ) Softens with Feeds                             (  x ) Easily Expresses Milk    (    ) Engorged    Maternal History:     BF other children? yes     Ages? 5 yrs       How Long? 6 months    Surgeries?   no                                           Low Milk Supply?    no                                Infant's Birth weight: 7-12.5   (  3580 gms)          Previous Weight: Date   12/14/17    Weight 7-4.8  (  3111 gms)            Wt Loss: 11.9 %    Today's Weight: 8-7.4 (  3838 gms)              Feeding History Since Discharge/Last  Appt.:Mother has been BFing at first cues. Infant had one episode of a 3 hour interval between feeds. All others have been 1-2 hours between feeds. Yesterday, mother did all feedings per bottle, EBM. Steve takes 2  1/2 - 4 ounces per feed. She is able to collect 5 oz in 20 mins of pumping. Steve drank 3 oz EBM per bottle 1 hr 45 mins prior to appointment.     Last 24 hours: Urines: 6-8   Stools: 8        Color of Stool: greenish, yellow, seedy      Pre Weight with Diaper:    8-7.4   ( 3866 gms)            Minutes  Left  Breast:      10  mins            Post Weight:    8-8.4    ( 3866 gms)        +28 gms    Minutes  Right  Breast:      10 mins            Post Weight     :8-8.7   ( 3876 gms) +10 gms  Mins Right breast 7 mins  Post weight;  8-8.9 (3880 gms)  +4 gms                       Total Minutes:    27 mins          Total Weight Gain:       42     gms    Average Feeding Amount for Age: 2-3 ounces or 60-90 " "mls/grams    Infant Assessment at Breast:   Appears:    ( x ) Alert          (   ) Sleepy                (   ) Irritable               Interested  ( x  )Great          (    ) Sucks w/ Stim    (   ) Little Interest  Suckle:       ( x  ) Coordinated (   ) Disorganized    (   )Tongue thrusts  (   )No Suckle    Tone:       ( x  ) Normal          (    ) Hypotonic          (    ) Tense-Hyperextends  Skin:          (x  ) Pink             (  ) Jaundice          Goals:             Attain Optimal Infant latch-on/poitioning                          Promote adequate infant nutrition and increased weight gain    Promote adequate maternal nutrition during lactation    Interventions:Assisted with deep positioning at breast, rolling nipple nose to chin. Mother had been \"aiming for the center , then manually pulling chin down to flare lips. She often fed in pain/ with pinching feeling during feed. Akira reports she noticed the left side felt much better with better positioning.     Education: proper latch at breast, compressions, average feed amounts, handouts on signs of a good feed, proper latching.     Feeding Plan:  Continue feeding at first cues. Keep Wilson awake during feed. Compress breasts while feeding.     Evaluation:   Interventions accomplished satisfactorily, requires no further action      Regular Follow Up Appointment  with MD office with  Dr. Omer              Appointment Date:1/9/17                  Signature: MAGGY Roberto     Faxed to: Michelle/Dr. Omer   Date: 1/2/18     Time:  1 PM      "

## 2018-09-28 ENCOUNTER — OFFICE VISIT (OUTPATIENT)
Dept: RETAIL CLINIC | Facility: CLINIC | Age: 33
End: 2018-09-28

## 2018-09-28 VITALS — HEART RATE: 101 BPM | OXYGEN SATURATION: 97 % | TEMPERATURE: 100.1 F

## 2018-09-28 DIAGNOSIS — Z20.818 EXPOSURE TO STREP THROAT: ICD-10-CM

## 2018-09-28 DIAGNOSIS — J02.9 ACUTE PHARYNGITIS, UNSPECIFIED ETIOLOGY: Primary | ICD-10-CM

## 2018-09-28 LAB
EXPIRATION DATE: NORMAL
INTERNAL CONTROL: NORMAL
Lab: NORMAL
S PYO AG THROAT QL: NEGATIVE

## 2018-09-28 PROCEDURE — 99203 OFFICE O/P NEW LOW 30 MIN: CPT | Performed by: NURSE PRACTITIONER

## 2018-09-28 PROCEDURE — 87880 STREP A ASSAY W/OPTIC: CPT | Performed by: NURSE PRACTITIONER

## 2018-09-28 RX ORDER — CEFDINIR 300 MG/1
300 CAPSULE ORAL 2 TIMES DAILY WITH MEALS
Qty: 20 CAPSULE | Refills: 0 | Status: SHIPPED | OUTPATIENT
Start: 2018-09-28 | End: 2018-10-08

## 2018-09-28 NOTE — PATIENT INSTRUCTIONS
Rapid Strep Test  Strep throat is a bacterial infection caused by the bacteria Streptococcus pyogenes. A rapid strep test is the quickest way to check if these bacteria are causing your sore throat. The test can be done at your health care provider's office. Results are usually ready in 10-20 minutes.  You may have this test if you have symptoms of strep throat. These include:  · A red throat with yellow or white spots.  · Neck swelling and tenderness.  · Fever.  · Loss of appetite.  · Trouble breathing or swallowing.  · Rash.  · Dehydration.    This test requires a sample of fluid from the back of your throat and tonsils. Your health care provider may hold down your tongue with a tongue depressor and use a swab to collect the sample.  Your health care provider may collect a second sample at the same time. The second sample may be used for a throat culture. In a culture test, the sample is combined with a substance that encourages bacteria to grow. It takes longer to get the results of the throat culture test, but they are more accurate. They can confirm the results from a rapid strep test, or show that those results were wrong.  What do the results mean?  It is your responsibility to obtain your test results. Ask the lab or department performing the test when and how you will get your results. Contact your health care provider to discuss any questions you have about your results.  The results of the rapid strep test will be negative or positive.  Meaning of Negative Test Results  If the result of your rapid strep test is negative, then it means:  · It is likely that you do not have strep throat.  · A virus may be causing your sore throat.    Your health care provider may do a throat culture to confirm the results of the rapid strep test. The throat culture can also identify the different strains of strep bacteria.  Meaning of Positive Test Results  If the result of your rapid strep test is positive, then it  means:  · It is likely that you do have strep throat.  · You may have to take antibiotics.    Your health care provider may do a throat culture to confirm the results of the rapid strep test. Strep throat usually requires a course of antibiotics.  Talk with your health care provider to discuss your results, treatment options, and if necessary, the need for more tests. Talk with your health care provider if you have any questions about your results.  This information is not intended to replace advice given to you by your health care provider. Make sure you discuss any questions you have with your health care provider.  Document Released: 01/25/2006 Document Revised: 08/23/2017 Document Reviewed: 03/26/2015  [employer] Interactive Patient Education © 2018 [employer].

## 2019-04-15 ENCOUNTER — OFFICE VISIT (OUTPATIENT)
Dept: RETAIL CLINIC | Facility: CLINIC | Age: 34
End: 2019-04-15

## 2019-04-15 VITALS — TEMPERATURE: 98.4 F | OXYGEN SATURATION: 97 % | HEART RATE: 90 BPM

## 2019-04-15 DIAGNOSIS — J03.00 STREP TONSILLITIS: Primary | ICD-10-CM

## 2019-04-15 LAB
EXPIRATION DATE: ABNORMAL
INTERNAL CONTROL: ABNORMAL
Lab: ABNORMAL
S PYO AG THROAT QL: POSITIVE

## 2019-04-15 PROCEDURE — 87880 STREP A ASSAY W/OPTIC: CPT | Performed by: NURSE PRACTITIONER

## 2019-04-15 PROCEDURE — 99213 OFFICE O/P EST LOW 20 MIN: CPT | Performed by: NURSE PRACTITIONER

## 2019-04-15 RX ORDER — CEFDINIR 300 MG/1
600 CAPSULE ORAL DAILY
Qty: 20 CAPSULE | Refills: 0 | Status: SHIPPED | OUTPATIENT
Start: 2019-04-15 | End: 2020-03-10

## 2019-04-15 NOTE — PROGRESS NOTES
Subjective   Luda Nguyen is a 33 y.o. female who presents to the clinic with:        Baby son has fever over weekend and refusal to eat.  Twin daughters had strep approx 2-3 weeks ago.      Sore Throat    This is a new problem. The current episode started yesterday. The problem has been rapidly worsening. The pain is worse on the right side. There has been no fever. The pain is mild. Associated symptoms include congestion, coughing, ear pain, a hoarse voice, a plugged ear sensation, swollen glands and trouble swallowing. Pertinent negatives include no ear discharge or headaches. She has had exposure to strep. She has had no exposure to mono. She has tried cool liquids (Benadryl) for the symptoms. The treatment provided no relief.          The following portions of the patient's history were reviewed and updated as appropriate: allergies, current medications, past family history, past medical history, past social history, past surgical history and problem list.        Review of Systems   Constitutional: Negative for activity change, appetite change, chills, fatigue and fever.   HENT: Positive for congestion, ear pain, hoarse voice, sore throat and trouble swallowing. Negative for ear discharge.    Respiratory: Positive for cough. Negative for wheezing.    Neurological: Negative for headaches.         Objective   Physical Exam   Constitutional: She appears well-developed and well-nourished.   HENT:   Head: Normocephalic.   Right Ear: Tympanic membrane and ear canal normal.   Left Ear: Tympanic membrane and ear canal normal.   Nose: Nose normal.   Mouth/Throat: Uvula is midline. Oropharyngeal exudate present. Tonsils are 2+ on the right. Tonsils are 2+ on the left. Tonsillar exudate.   Eyes: Conjunctivae are normal. Pupils are equal, round, and reactive to light.   Neck: Normal range of motion.   Cardiovascular: Normal rate and regular rhythm.   Pulmonary/Chest: Effort normal and breath sounds normal.    Lymphadenopathy:        Head (right side): Tonsillar and preauricular adenopathy present.        Head (left side): No tonsillar and no preauricular adenopathy present.     She has cervical adenopathy.        Right cervical: Superficial cervical adenopathy present.   Vitals reviewed.        Assessment/Plan   Luda was seen today for sore throat.    Diagnoses and all orders for this visit:    Strep tonsillitis  -     POCT rapid strep A    Other orders  -     cefdinir (OMNICEF) 300 MG capsule; Take 2 capsules by mouth Daily.      Positive strep  cepacol throat lozenge  Work excuse

## 2020-03-10 ENCOUNTER — OFFICE VISIT (OUTPATIENT)
Dept: RETAIL CLINIC | Facility: CLINIC | Age: 35
End: 2020-03-10

## 2020-03-10 ENCOUNTER — OFFICE VISIT (OUTPATIENT)
Dept: OBSTETRICS AND GYNECOLOGY | Facility: CLINIC | Age: 35
End: 2020-03-10

## 2020-03-10 VITALS
DIASTOLIC BLOOD PRESSURE: 70 MMHG | SYSTOLIC BLOOD PRESSURE: 120 MMHG | OXYGEN SATURATION: 99 % | HEART RATE: 84 BPM | TEMPERATURE: 98.8 F

## 2020-03-10 VITALS
HEIGHT: 66 IN | DIASTOLIC BLOOD PRESSURE: 72 MMHG | BODY MASS INDEX: 20.41 KG/M2 | SYSTOLIC BLOOD PRESSURE: 114 MMHG | WEIGHT: 127 LBS

## 2020-03-10 DIAGNOSIS — Z01.419 WELL WOMAN EXAM WITH ROUTINE GYNECOLOGICAL EXAM: Primary | ICD-10-CM

## 2020-03-10 DIAGNOSIS — Z78.9 NONSMOKER: ICD-10-CM

## 2020-03-10 DIAGNOSIS — F41.9 ANXIETY: ICD-10-CM

## 2020-03-10 DIAGNOSIS — R05.9 COUGHING: ICD-10-CM

## 2020-03-10 DIAGNOSIS — R68.82 LIBIDO, DECREASED: ICD-10-CM

## 2020-03-10 DIAGNOSIS — J40 BRONCHITIS: Primary | ICD-10-CM

## 2020-03-10 LAB
EXPIRATION DATE: NORMAL
EXPIRATION DATE: NORMAL
FLUAV AG NPH QL: NEGATIVE
FLUBV AG NPH QL: NEGATIVE
INTERNAL CONTROL: NORMAL
INTERNAL CONTROL: NORMAL
Lab: NORMAL
Lab: NORMAL
S PYO AG THROAT QL: NEGATIVE

## 2020-03-10 PROCEDURE — 87491 CHLMYD TRACH DNA AMP PROBE: CPT | Performed by: OBSTETRICS & GYNECOLOGY

## 2020-03-10 PROCEDURE — 87804 INFLUENZA ASSAY W/OPTIC: CPT | Performed by: NURSE PRACTITIONER

## 2020-03-10 PROCEDURE — 96372 THER/PROPH/DIAG INJ SC/IM: CPT | Performed by: NURSE PRACTITIONER

## 2020-03-10 PROCEDURE — 87591 N.GONORRHOEAE DNA AMP PROB: CPT | Performed by: OBSTETRICS & GYNECOLOGY

## 2020-03-10 PROCEDURE — 99213 OFFICE O/P EST LOW 20 MIN: CPT | Performed by: NURSE PRACTITIONER

## 2020-03-10 PROCEDURE — 87624 HPV HI-RISK TYP POOLED RSLT: CPT | Performed by: OBSTETRICS & GYNECOLOGY

## 2020-03-10 PROCEDURE — 87661 TRICHOMONAS VAGINALIS AMPLIF: CPT | Performed by: OBSTETRICS & GYNECOLOGY

## 2020-03-10 PROCEDURE — 87880 STREP A ASSAY W/OPTIC: CPT | Performed by: NURSE PRACTITIONER

## 2020-03-10 PROCEDURE — 99385 PREV VISIT NEW AGE 18-39: CPT | Performed by: OBSTETRICS & GYNECOLOGY

## 2020-03-10 PROCEDURE — G0123 SCREEN CERV/VAG THIN LAYER: HCPCS | Performed by: OBSTETRICS & GYNECOLOGY

## 2020-03-10 RX ORDER — DEXAMETHASONE SODIUM PHOSPHATE 10 MG/ML
10 INJECTION INTRAMUSCULAR; INTRAVENOUS ONCE
Status: DISCONTINUED | OUTPATIENT
Start: 2020-03-10 | End: 2020-03-10

## 2020-03-10 RX ORDER — AZITHROMYCIN 250 MG/1
TABLET, FILM COATED ORAL
Qty: 6 TABLET | Refills: 0 | Status: SHIPPED | OUTPATIENT
Start: 2020-03-10 | End: 2020-03-10

## 2020-03-10 RX ORDER — LORAZEPAM 1 MG/1
0.5 TABLET ORAL
Qty: 60 TABLET | Refills: 0 | Status: SHIPPED | OUTPATIENT
Start: 2020-03-10 | End: 2020-06-09 | Stop reason: SDUPTHER

## 2020-03-10 RX ORDER — LORAZEPAM 0.5 MG/1
0.5 TABLET ORAL
Qty: 30 TABLET | Refills: 2 | Status: CANCELLED | OUTPATIENT
Start: 2020-03-10

## 2020-03-10 RX ADMIN — DEXAMETHASONE SODIUM PHOSPHATE 10 MG: 10 INJECTION INTRAMUSCULAR; INTRAVENOUS at 12:21

## 2020-03-10 NOTE — PATIENT INSTRUCTIONS
Acute Bronchitis, Adult  Acute bronchitis is when air tubes (bronchi) in the lungs suddenly get swollen. The condition can make it hard to breathe. It can also cause these symptoms:  · A cough.  · Coughing up clear, yellow, or green mucus.  · Wheezing.  · Chest congestion.  · Shortness of breath.  · A fever.  · Body aches.  · Chills.  · A sore throat.  Follow these instructions at home:    Medicines  · Take over-the-counter and prescription medicines only as told by your doctor.  · If you were prescribed an antibiotic medicine, take it as told by your doctor. Do not stop taking the antibiotic even if you start to feel better.  General instructions  · Rest.  · Drink enough fluids to keep your pee (urine) pale yellow.  · Avoid smoking and secondhand smoke. If you smoke and you need help quitting, ask your doctor. Quitting will help your lungs heal faster.  · Use an inhaler, cool mist vaporizer, or humidifier as told by your doctor.  · Keep all follow-up visits as told by your doctor. This is important.  How is this prevented?  To lower your risk of getting this condition again:  · Wash your hands often with soap and water. If you cannot use soap and water, use hand .  · Avoid contact with people who have cold symptoms.  · Try not to touch your hands to your mouth, nose, or eyes.  · Make sure to get the flu shot every year.  Contact a doctor if:  · Your symptoms do not get better in 2 weeks.  Get help right away if:  · You cough up blood.  · You have chest pain.  · You have very bad shortness of breath.  · You become dehydrated.  · You faint (pass out) or keep feeling like you are going to pass out.  · You keep throwing up (vomiting).  · You have a very bad headache.  · Your fever or chills gets worse.  This information is not intended to replace advice given to you by your health care provider. Make sure you discuss any questions you have with your health care provider.  Document Released: 06/05/2009 Document  Revised: 08/01/2018 Document Reviewed: 06/07/2017  [employer] Interactive Patient Education © 2020 Elsevier Inc.

## 2020-03-10 NOTE — PROGRESS NOTES
"Subjective      Luda Nguyen is a 34 y.o. female who presents for her routine annual exam. Periods are spacing out a little bit lately, lasting 4-5 days. Dysmenorrhea:moderate, occurring first 1-2 days of flow - which she describes as being really heavy.  The rest of her cycle is light.   Cyclic symptoms include bloating and headache. No intermenstrual bleeding, spotting, or discharge.    Patient reports recently having \"frequent\" yeast infections every 2-3 months.  This has been going on for at least a year, but maybe longer.  She reports recognizing infections by discharge, but they are not typically very itchy - she usually treats with OTC medication and reports that Monistat always works pretty quickly.      Patient previously on ativan years ago.  More recently she got a 1 month supply from another provider and reports that this really helps with racing thoughts that prevent her from getting to sleep at night.  Patient admits to marriage problems, but says that they are in couples therapy and feels like it is helping.  She previously tried Lexapro and did not like it - she feels like she slept all the time.  She wants to continue that medication and would like to have enough to take one every night.    Current contraception: condoms.  Patient did not like taking OCP's because she reports that they made her feel bloated.  Regular self breast exam: yes, no concerns  History of abnormal Pap smear: no  History of abnormal mammogram: no - patient cannot remember why she has had a mammogram in the past  Exercise: daily    Menstrual History:  OB History        2    Para   2    Term   2            AB        Living   3       SAB        TAB        Ectopic        Molar        Multiple   1    Live Births   3               Patient's last menstrual period was 2020 (within days).       The following portions of the patient's history were reviewed and updated as appropriate: allergies, current medications, " "past family history, past medical history, past social history, past surgical history and problem list.    heterosexual    Family History  Family History   Problem Relation Age of Onset   • Lung cancer Maternal Grandmother    • Lung cancer Maternal Uncle        Review of Systems  Review of Systems   Constitutional: Negative for activity change and unexpected weight loss.   HENT: Negative for congestion.    Respiratory: Negative for shortness of breath.    Cardiovascular: Negative for chest pain.   Gastrointestinal: Negative for abdominal pain, blood in stool, constipation and diarrhea.   Endocrine: Negative for cold intolerance and heat intolerance.   Genitourinary: Positive for decreased libido (pt reports hormones were checked and she was told that they were normal) and menstrual problem. Negative for breast lump, breast pain, dyspareunia, pelvic pain, urinary incontinence, vaginal discharge and vaginal pain.   Musculoskeletal: Negative for arthralgias, back pain, neck pain and neck stiffness.   Skin: Negative for rash.   Neurological: Positive for headache (some months with menses). Negative for dizziness.   Psychiatric/Behavioral: Positive for sleep disturbance (racing thoughts). Negative for depressed mood. The patient is nervous/anxious.              Objective        /72   Ht 167.6 cm (66\")   Wt 57.6 kg (127 lb)   LMP 02/18/2020 (Within Days)   BMI 20.50 kg/m²   Physical Exam   Constitutional: She is oriented to person, place, and time. She appears well-developed and well-nourished. No distress.   HENT:   Head: Normocephalic and atraumatic.   Eyes: EOM are normal.   Neck: Normal range of motion. Neck supple.   Cardiovascular: Normal rate and regular rhythm.   No murmur heard.  Pulmonary/Chest: Effort normal and breath sounds normal. Right breast exhibits no inverted nipple and no mass. Left breast exhibits no inverted nipple and no mass. No breast tenderness or discharge.   Abdominal: Soft. She " exhibits no distension. There is no tenderness.   Genitourinary: Vagina normal and uterus normal. Pelvic exam was performed with patient supine. There is no tenderness or lesion on the right labia. There is no tenderness or lesion on the left labia. Cervix does not exhibit motion tenderness, discharge or friability. Right adnexum displays no tenderness and no fullness. Left adnexum displays no tenderness and no fullness. No tenderness or bleeding in the vagina. No vaginal discharge found.   Musculoskeletal: Normal range of motion. She exhibits no edema.   Neurological: She is alert and oriented to person, place, and time.   Skin: Skin is warm and dry.   Psychiatric: She has a normal mood and affect. Her behavior is normal. Judgment normal.   Nursing note and vitals reviewed.            Assessment  & Plan      Luda was seen today for gynecologic exam.    Diagnoses and all orders for this visit:    Well woman exam with routine gynecological exam: Exam unremarkable.  PAP collected, and STD testing added with patient consent.  Mammograms to start at 40.  Routine screening labs not indicated.  Regular SBE reported.  Periods becoming slightly irregular and heavier for first two days.  Patient willing to try Lo Loestrin - she did not like stronger pills in the past, but that was many years ago and she is hopeful that she will be able to tolerate a low-dose pill.  -     Liquid-based Pap Smear, Screening    Anxiety: Patient has been on Ativan and specifically requests to go back on it.  She is having trouble calming down enough at bedtime to fall asleep.  She previously tried Lexapro and did not like it.  Patient understands this is a controlled substance and that she will need to be seen every 90 days.  -     LORazepam (ATIVAN) 1 MG tablet; Take 0.5 tablets by mouth every night at bedtime.    BMI 20.0-20.9, adult    Nonsmoker    Decreased Libido: Patient not a candidate for Addyi since she drinks wine on the weekends,  but does want to try Vyleesi injections.  Script to specialty pharmacy    Other orders  -     Bremelanotide Acetate (VYLEESI) 1.75 MG/0.3ML solution auto-injector; Inject 1.75 mg under the skin into the appropriate area as directed As Needed (for intercourse) for up to 10 days.  -     Norethin-Eth Estrad-Fe Biphas 1 MG-10 MCG / 10 MCG tablet; Take 1 tablet by mouth Daily.    RTO in 3 months      This note was electronically signed.    Natasha Acosta MD  3/10/2020  14:43

## 2020-03-10 NOTE — PROGRESS NOTES
Subjective   Luda Nguyen is a 34 y.o. female who presents to the clinic with:        NO travel however she is leaving for [place] for school/work trip today and doesn't want to be sick there, request steroid shot and antibiotic if get worse.  Feel it moving down into her chest now.  Right ear pain and throat really sore.  Flu B over December 2019, no flu vaccine.  Last steroid shot last year.    URI    This is a new problem. The current episode started in the past 7 days (three days ago). The problem has been gradually worsening. There has been no fever. Associated symptoms include congestion, coughing, ear pain, headaches, a plugged ear sensation, sinus pain and a sore throat. Pertinent negatives include no abdominal pain, diarrhea, nausea, vomiting or wheezing. Associated symptoms comments: Green nasal discharge at times but green productive cough.  . Treatments tried: Mucinex, Nyquil.          The following portions of the patient's history were reviewed and updated as appropriate: allergies, current medications, past family history, past medical history, past social history, past surgical history and problem list.        Review of Systems   Constitutional: Positive for activity change and fatigue. Negative for appetite change and fever.   HENT: Positive for congestion, ear pain, sinus pain, sore throat and voice change.    Respiratory: Positive for cough. Negative for chest tightness, shortness of breath and wheezing.    Gastrointestinal: Negative for abdominal pain, diarrhea, nausea and vomiting.   Neurological: Positive for headaches.         Objective   Physical Exam   Constitutional: She appears well-developed and well-nourished.   HENT:   Head: Normocephalic.   Right Ear: Tympanic membrane and ear canal normal.   Left Ear: Tympanic membrane and ear canal normal.   Mouth/Throat: Uvula is midline. Posterior oropharyngeal erythema present.   Brown hairy tongue.  Green nasal thick small amt discharge   Eyes:  Pupils are equal, round, and reactive to light. Conjunctivae are normal.   Neck: Normal range of motion.   Cardiovascular: Normal rate and regular rhythm.   Pulmonary/Chest: Effort normal and breath sounds normal.   Lymphadenopathy:     She has cervical adenopathy.   Vitals reviewed.        Assessment/Plan   Luda was seen today for uri.    Diagnoses and all orders for this visit:    Bronchitis  -     POCT rapid strep A  -     POCT Influenza A/B  -     dexamethasone (DECADRON) injection 10 mg    Coughing  -     POCT rapid strep A  -     POCT Influenza A/B  -     dexamethasone (DECADRON) injection 10 mg    Other orders  -     azithromycin (ZITHROMAX) 250 MG tablet; Take 2 tablets the first day, then 1 tablet daily for 4 days.      Steroid education  Negative strep and flu  Voice rest/fluids/humidity  zpak started if worsen while out of town

## 2020-03-16 LAB
C TRACH RRNA CVX QL NAA+PROBE: NOT DETECTED
GEN CATEG CVX/VAG CYTO-IMP: NORMAL
HPV I/H RISK 4 DNA CVX QL PROBE+SIG AMP: NOT DETECTED
LAB AP CASE REPORT: NORMAL
LAB AP GYN ADDITIONAL INFORMATION: NORMAL
LAB AP GYN OTHER FINDINGS: NORMAL
N GONORRHOEA RRNA SPEC QL NAA+PROBE: NOT DETECTED
PATH INTERP SPEC-IMP: NORMAL
STAT OF ADQ CVX/VAG CYTO-IMP: NORMAL
TRICHOMONAS VAGINALIS PCR: NOT DETECTED

## 2020-06-09 ENCOUNTER — OFFICE VISIT (OUTPATIENT)
Dept: OBSTETRICS AND GYNECOLOGY | Facility: CLINIC | Age: 35
End: 2020-06-09

## 2020-06-09 VITALS
SYSTOLIC BLOOD PRESSURE: 118 MMHG | HEIGHT: 66 IN | DIASTOLIC BLOOD PRESSURE: 78 MMHG | BODY MASS INDEX: 20.89 KG/M2 | WEIGHT: 130 LBS

## 2020-06-09 DIAGNOSIS — Z78.9 NONSMOKER: ICD-10-CM

## 2020-06-09 DIAGNOSIS — F41.9 ANXIETY: Primary | ICD-10-CM

## 2020-06-09 PROCEDURE — 99213 OFFICE O/P EST LOW 20 MIN: CPT | Performed by: OBSTETRICS & GYNECOLOGY

## 2020-06-09 RX ORDER — LORAZEPAM 1 MG/1
1 TABLET ORAL 2 TIMES DAILY PRN
Qty: 60 TABLET | Refills: 1 | Status: SHIPPED | OUTPATIENT
Start: 2020-06-09 | End: 2020-10-14 | Stop reason: SDUPTHER

## 2020-06-09 NOTE — PROGRESS NOTES
Subjective   Chief Complaint   Patient presents with   • Med Refill     pt here today for follow up after starting Ativan. pt voices that she is still having anxiety but thinks it is related to COVID. pt voices that it is a little better but is worse at night. pt voices no other concerns.     Luda Nguyen is a 34 y.o. year old .  Patient's last menstrual period was 2020 (exact date).  She presents for 3 month follow-up of anxiety.  We re-started her ativan at annual exam 3 months ago.  Patient feels like it helps, but also says that she is needing it frequently because COVID/quarantine situation has really exacerbated her anxiety.  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  Patient reports anxiety is always worst right before bedtime at night. Patient reports she has occasionally had to take a second dose during the middle of the night, but she has never taken it during the day.    In regard to starting OCP's, the patient reports that her bleeding was increased and more-irregular for the first 1-2 packs of pills, but that has resolved and she is back on a regular schedule.  She is currently on her period now and bleeding is light.    The following portions of the patient's history were reviewed and updated as appropriate:current medications and allergies    Social History    Tobacco Use      Smoking status: Never Smoker      Smokeless tobacco: Never Used    Review of Systems   Constitutional: Negative for activity change and unexpected weight change.   Respiratory: Negative for shortness of breath.    Cardiovascular: Negative for chest pain.   Gastrointestinal: Negative for abdominal pain.   Genitourinary: Positive for vaginal bleeding (on period, flow is light). Negative for menstrual problem and pelvic pain.   Psychiatric/Behavioral: Negative for dysphoric mood. The patient is nervous/anxious.   "        Objective   /78   Ht 167.6 cm (66\")   Wt 59 kg (130 lb)   LMP 06/09/2020 (Exact Date)   BMI 20.98 kg/m²     Physical Exam   Constitutional: She is oriented to person, place, and time. She appears well-developed and well-nourished. No distress.   HENT:   Head: Normocephalic and atraumatic.   Eyes: EOM are normal.   Neck: Normal range of motion.   Pulmonary/Chest: Effort normal.   Abdominal: Soft. She exhibits no distension. There is no tenderness.   Musculoskeletal: Normal range of motion.   Neurological: She is alert and oriented to person, place, and time.   Skin: Skin is warm and dry.   Psychiatric: She has a normal mood and affect. Her behavior is normal. Judgment normal.   Nursing note and vitals reviewed.      Lab Review   No data reviewed    Imaging   No data reviewed     Assessment & Plan    Luda was seen today for med refill.    Diagnoses and all orders for this visit:    Anxiety: Patient feels like most of her anxiety is secondary to life changes brought about by social distancing.  Ativan helping, and she wants to continue.  Discussed benefits of exercise and encouraged her to try yoga jazmine on phone.  RTO in 3 months.  Will check ELIZABETH    BMI 21.0-21.9, adult    Nonsmoker      This note was electronically signed.    Natasha Acosta MD  June 9, 2020  10:15    Total time spent today with Luda  was 20 minutes (level 3).  Greater than 50% of the time was spent coordinating care, answering her questions and counseling regarding pathophysiology of her presenting problem along with plans for any diagnositc work-up and treatment.      "

## 2020-09-09 ENCOUNTER — TELEPHONE (OUTPATIENT)
Dept: OBSTETRICS AND GYNECOLOGY | Facility: CLINIC | Age: 35
End: 2020-09-09

## 2020-09-09 NOTE — TELEPHONE ENCOUNTER
Patients appointment was originally scheduled for 9/9 with Dave but appointment was rescheduled for 10/14. Will patient need sooner appointment?

## 2020-09-14 ENCOUNTER — TELEPHONE (OUTPATIENT)
Dept: OBSTETRICS AND GYNECOLOGY | Facility: CLINIC | Age: 35
End: 2020-09-14

## 2020-10-14 ENCOUNTER — OFFICE VISIT (OUTPATIENT)
Dept: OBSTETRICS AND GYNECOLOGY | Facility: CLINIC | Age: 35
End: 2020-10-14

## 2020-10-14 VITALS
WEIGHT: 126 LBS | DIASTOLIC BLOOD PRESSURE: 70 MMHG | SYSTOLIC BLOOD PRESSURE: 112 MMHG | HEIGHT: 66 IN | BODY MASS INDEX: 20.25 KG/M2

## 2020-10-14 DIAGNOSIS — F41.9 ANXIETY: ICD-10-CM

## 2020-10-14 DIAGNOSIS — Z78.9 NONSMOKER: ICD-10-CM

## 2020-10-14 PROCEDURE — 99213 OFFICE O/P EST LOW 20 MIN: CPT | Performed by: OBSTETRICS & GYNECOLOGY

## 2020-10-14 RX ORDER — LORAZEPAM 1 MG/1
1 TABLET ORAL 2 TIMES DAILY PRN
Qty: 30 TABLET | Refills: 2 | Status: SHIPPED | OUTPATIENT
Start: 2020-10-14 | End: 2021-01-13 | Stop reason: SDUPTHER

## 2020-10-14 NOTE — PROGRESS NOTES
"Subjective   Chief Complaint   Patient presents with   • Med Refill     pt here today for follow up after upping dosage of Ativan. pt voices she is doing well. pt voices no concerns.      Luda Nguyen is a 34 y.o. year old .  No LMP recorded.  She presents for 3 month follow-up of anxiety.  We re-started her ativan at annual exam 6 months ago.  Patient feels like it helps; she is taking it every night about 7:30 and rarely takes it during the day.    The following portions of the patient's history were reviewed and updated as appropriate:current medications and allergies    Social History    Tobacco Use      Smoking status: Never Smoker      Smokeless tobacco: Never Used    Review of Systems   Constitutional: Negative for activity change and unexpected weight change.   Respiratory: Negative for shortness of breath.    Cardiovascular: Negative for chest pain.   Gastrointestinal: Negative for abdominal pain.   Genitourinary: Positive for vaginal bleeding (on period, flow is light). Negative for menstrual problem and pelvic pain.   Psychiatric/Behavioral: Negative for dysphoric mood. The patient is nervous/anxious.          Objective   /70   Ht 167.6 cm (66\")   Wt 57.2 kg (126 lb)   BMI 20.34 kg/m²     Physical Exam   Constitutional: She is oriented to person, place, and time. She appears well-developed. No distress.   HENT:   Head: Normocephalic and atraumatic.   Neck: Normal range of motion.   Pulmonary/Chest: Effort normal.   Abdominal: Soft. She exhibits no distension. There is no abdominal tenderness.   Musculoskeletal: Normal range of motion.   Neurological: She is alert and oriented to person, place, and time.   Skin: Skin is warm and dry.   Psychiatric: Her behavior is normal. Judgment normal.   Nursing note and vitals reviewed.      Lab Review   No data reviewed    Imaging   No data reviewed     Assessment & Plan    Luda was seen today for med refill.    Diagnoses and all orders for this " visit:    Anxiety: Patient feels like most of her anxiety is secondary to life changes brought about by pandemic.  This visit, she admits that some of her anxiety is also marriage-related.  She worries about  sleeping since he has apnea and is restless.  Ativan helping, and she wants to continue.  At last visit we discussed benefits of exercise and I encouraged her to try yoga jazmine on phone.  RTO in 3 months.  Will check ELIZABETH    BMI 20, adult    Nonsmoker      This note was electronically signed.    Natasha Acosta MD  October 14, 2020  14:03 CDT    Total time spent today with Luda  was 20 minutes (level 3).  Greater than 50% of the time was spent coordinating care, answering her questions and counseling regarding pathophysiology of her presenting problem along with plans for any diagnositc work-up and treatment.

## 2021-01-13 ENCOUNTER — OFFICE VISIT (OUTPATIENT)
Dept: OBSTETRICS AND GYNECOLOGY | Facility: CLINIC | Age: 36
End: 2021-01-13

## 2021-01-13 VITALS
HEIGHT: 66 IN | BODY MASS INDEX: 20.57 KG/M2 | WEIGHT: 128 LBS | SYSTOLIC BLOOD PRESSURE: 118 MMHG | DIASTOLIC BLOOD PRESSURE: 64 MMHG

## 2021-01-13 DIAGNOSIS — F41.9 ANXIETY: Primary | ICD-10-CM

## 2021-01-13 DIAGNOSIS — Z78.9 NONSMOKER: ICD-10-CM

## 2021-01-13 DIAGNOSIS — R45.86 MOOD SWINGS: ICD-10-CM

## 2021-01-13 DIAGNOSIS — Z30.41 ENCOUNTER FOR SURVEILLANCE OF CONTRACEPTIVE PILLS: ICD-10-CM

## 2021-01-13 PROCEDURE — 99213 OFFICE O/P EST LOW 20 MIN: CPT | Performed by: OBSTETRICS & GYNECOLOGY

## 2021-01-13 RX ORDER — DROSPIRENONE AND ETHINYL ESTRADIOL 0.02-3(28)
1 KIT ORAL DAILY
Qty: 28 TABLET | Refills: 12 | Status: SHIPPED | OUTPATIENT
Start: 2021-01-13 | End: 2021-04-13

## 2021-01-13 RX ORDER — LORAZEPAM 1 MG/1
1 TABLET ORAL 2 TIMES DAILY PRN
Qty: 30 TABLET | Refills: 2 | Status: SHIPPED | OUTPATIENT
Start: 2021-01-13 | End: 2021-04-13 | Stop reason: SDUPTHER

## 2021-01-13 NOTE — PROGRESS NOTES
"Subjective   Chief Complaint   Patient presents with   • Med Refill     pt here today for med refill on Ativan. pt wanting to discuss curent birth control. pt voices no other concerns.      Luda Nguyen is a 35 y.o. year old .  Patient's last menstrual period was 2021 (exact date).  She presents to be seen for follow-up anxiety.  Patient reports that with ativan, her anxiety is \"manageable\".  It is better during the day when she has things to focus on, but worse at night when she has more racing thoughts.  Patient feels like she is doing well.  She feels more comfortable with ativan than things she has taken in the past (lexapro).    Patient taking LoLoestrin, and is due to start a new pack today.  She is taking it mostly for cycle control and reports that it has made her bleeding better.  She feels like her mood is still a little more \"unsteady\" than she would like for it to be and wonders if that is a side effect of her birth control.  She would like to try a different pill that might help more with mood swings    The following portions of the patient's history were reviewed and updated as appropriate:current medications and allergies    Social History    Tobacco Use      Smoking status: Never Smoker      Smokeless tobacco: Never Used    Review of Systems   Constitutional: Negative for activity change and unexpected weight change.   Respiratory: Negative for shortness of breath.    Cardiovascular: Negative for chest pain.   Gastrointestinal: Negative for abdominal pain.   Genitourinary: Negative for menstrual problem (usually very light, lasting 7 days on pills), pelvic pain and vaginal bleeding.   Psychiatric/Behavioral: Negative for dysphoric mood. The patient is nervous/anxious.          Objective   /64   Ht 167.6 cm (66\")   Wt 58.1 kg (128 lb)   LMP 2021 (Exact Date)   BMI 20.66 kg/m²     Physical Exam  Vitals signs and nursing note reviewed.   Constitutional:       General: She " is not in acute distress.     Appearance: She is well-developed.   HENT:      Head: Normocephalic and atraumatic.   Neck:      Musculoskeletal: Normal range of motion.      Thyroid: No thyromegaly.   Pulmonary:      Effort: Pulmonary effort is normal.   Abdominal:      General: There is no distension.      Palpations: Abdomen is soft.      Tenderness: There is no abdominal tenderness.   Musculoskeletal: Normal range of motion.   Skin:     General: Skin is warm and dry.   Neurological:      Mental Status: She is alert and oriented to person, place, and time.   Psychiatric:         Behavior: Behavior normal.         Judgment: Judgment normal.         Lab Review   No data reviewed    Imaging   No data reviewed     Assessment & Plan    Diagnoses and all orders for this visit:    1. Anxiety (Primary): Patient received Ativan 3 months ago.  She had tried it in the past, but has been off of it for a long time.  She has seen improvement in her anxiety, and wants to continue.  She had previously tried Lexapro and not done well.  Return to office in 3 months for follow-up  -     LORazepam (Ativan) 1 MG tablet; Take 1 tablet by mouth 2 (Two) Times a Day As Needed for Anxiety.  Dispense: 30 tablet; Refill: 2    2. BMI 20.0-20.9, adult    3. Nonsmoker    4. Encounter for surveillance of contraceptive pills: Patient is currently on Lo Loestrin for cycle control and reports that it is definitely in the proved her flow and diminished her cramping.  She has, however, had more mood swings and wonders if it is related to the birth control pills.  Even if the symptom of moodiness cannot be attributed to her pills, she is wondering if there is one that might be more beneficial for this complaint.  She is going to try yes for the next 2 months and see if a different progesterone will diminish her mood swings    5. Mood swings    Other orders  -     drospirenone-ethinyl estradiol (ANATOLIY) 3-0.02 MG per tablet; Take 1 tablet by mouth Daily.   Dispense: 28 tablet; Refill: 12      This note was electronically signed.    Natasha Acosta MD  January 13, 2021  13:14 CST    Total time spent today with Luda  was 20-29 minutes (level 3).  Greater than 50% of the time was spent coordinating care, answering her questions and counseling regarding pathophysiology of her presenting problem along with plans for any diagnositc work-up and treatment.

## 2021-04-13 ENCOUNTER — OFFICE VISIT (OUTPATIENT)
Dept: OBSTETRICS AND GYNECOLOGY | Facility: CLINIC | Age: 36
End: 2021-04-13

## 2021-04-13 VITALS
WEIGHT: 128 LBS | DIASTOLIC BLOOD PRESSURE: 70 MMHG | BODY MASS INDEX: 20.57 KG/M2 | HEIGHT: 66 IN | SYSTOLIC BLOOD PRESSURE: 114 MMHG

## 2021-04-13 DIAGNOSIS — F41.9 ANXIETY: Primary | ICD-10-CM

## 2021-04-13 DIAGNOSIS — Z78.9 NONSMOKER: ICD-10-CM

## 2021-04-13 DIAGNOSIS — R45.86 MOOD SWINGS: ICD-10-CM

## 2021-04-13 DIAGNOSIS — Z30.09 ENCOUNTER FOR OTHER GENERAL COUNSELING OR ADVICE ON CONTRACEPTION: ICD-10-CM

## 2021-04-13 PROCEDURE — 99213 OFFICE O/P EST LOW 20 MIN: CPT | Performed by: OBSTETRICS & GYNECOLOGY

## 2021-04-13 RX ORDER — LORAZEPAM 1 MG/1
1 TABLET ORAL 2 TIMES DAILY PRN
Qty: 30 TABLET | Refills: 2 | Status: SHIPPED | OUTPATIENT
Start: 2021-04-13

## 2021-04-13 RX ORDER — FLUTICASONE PROPIONATE 50 MCG
SPRAY, SUSPENSION (ML) NASAL
COMMUNITY
Start: 2021-03-25 | End: 2022-01-18

## 2021-04-13 RX ORDER — AZELASTINE HCL 205.5 UG/1
SPRAY NASAL
COMMUNITY
Start: 2021-03-25 | End: 2021-11-22

## 2021-04-13 NOTE — PROGRESS NOTES
"Subjective   Chief Complaint   Patient presents with   • Med Refill     pt here today for refill on Ativan. pt voices feeling well. pt voices no concerns.      Luda Nguyen is a 35 y.o. year old .  Patient's last menstrual period was 2021 (exact date).  She presents to be seen for follow-up anxiety.  Patient reports that with ativan, her anxiety is \"manageable\". Patient only taking Ativan at night, and sometimes she will try to only take 1/2 tablet.    Patient stopped taking Aniya due to dizzy spells for two days.  She has not had any spells since that time.  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  Patient thinks that for now, she wants to just go without.  She is ok with using condoms for contraception.    The following portions of the patient's history were reviewed and updated as appropriate:current medications and allergies    Social History    Tobacco Use      Smoking status: Never Smoker      Smokeless tobacco: Never Used    Review of Systems   Constitutional: Negative for activity change and unexpected weight change.   Respiratory: Negative for shortness of breath.    Cardiovascular: Negative for chest pain.   Gastrointestinal: Negative for abdominal pain.   Genitourinary: Negative for menstrual problem (usually very light, lasting 7 days on pills), pelvic pain and vaginal bleeding.   Psychiatric/Behavioral: Negative for dysphoric mood. The patient is nervous/anxious.          Objective   /70   Ht 167.6 cm (66\")   Wt 58.1 kg (128 lb)   LMP 2021 (Exact Date)   BMI 20.66 kg/m²     Physical Exam  Vitals and nursing note reviewed.   Constitutional:       General: She is not in acute distress.     Appearance: She is well-developed.   HENT:      Head: Normocephalic and atraumatic.   Neck:      Thyroid: No thyromegaly. "   Pulmonary:      Effort: Pulmonary effort is normal.   Abdominal:      General: There is no distension.      Palpations: Abdomen is soft.      Tenderness: There is no abdominal tenderness.   Musculoskeletal:         General: Normal range of motion.      Cervical back: Normal range of motion.   Skin:     General: Skin is warm and dry.   Neurological:      Mental Status: She is alert and oriented to person, place, and time.   Psychiatric:         Behavior: Behavior normal.         Judgment: Judgment normal.         Lab Review   No data reviewed    Imaging   No data reviewed     Assessment & Plan    Diagnoses and all orders for this visit:    1. Anxiety (Primary): Patient received Ativan 6 months ago.  She had tried it in the past, but has been off of it for a long time.  She has seen improvement in her anxiety, and wants to continue.  She had previously tried Lexapro and not done well.  Return to office in 3 months for follow-up  -     LORazepam (Ativan) 1 MG tablet; Take 1 tablet by mouth 2 (Two) Times a Day As Needed for Anxiety.  Dispense: 30 tablet; Refill: 2    2. BMI 20.0-20.9, adult: normal    3. Nonsmoker    4. General discussion of contraceptive options: Patient is not interested in trying any other birth control pills since she is ready tried several.  Mirena brochure reviewed with the patient, again.  She is curious, but not sure that she wants 1.  Patient doing brochure with her and will consider.    5. Mood swings: Worst midcycle, associated with ovulation      This note was electronically signed.    Natasha Acosta MD  April 13, 2021  13:24 CDT    Total time spent today with Luda  was 20-29 minutes (level 3).  Greater than 50% of the time was spent coordinating care, answering her questions and counseling regarding pathophysiology of her presenting problem along with plans for any diagnositc work-up and treatment.

## 2021-11-05 ENCOUNTER — CLINICAL SUPPORT (OUTPATIENT)
Dept: PEDIATRICS | Facility: CLINIC | Age: 36
End: 2021-11-05

## 2021-11-05 DIAGNOSIS — Z23 NEEDS FLU SHOT: Primary | ICD-10-CM

## 2021-11-05 PROCEDURE — 90471 IMMUNIZATION ADMIN: CPT | Performed by: NURSE PRACTITIONER

## 2021-11-05 PROCEDURE — 90686 IIV4 VACC NO PRSV 0.5 ML IM: CPT | Performed by: NURSE PRACTITIONER

## 2021-11-22 PROCEDURE — U0004 COV-19 TEST NON-CDC HGH THRU: HCPCS | Performed by: NURSE PRACTITIONER

## 2021-12-02 DIAGNOSIS — F41.9 ANXIETY: ICD-10-CM

## 2021-12-02 RX ORDER — LORAZEPAM 1 MG/1
1 TABLET ORAL 2 TIMES DAILY PRN
Qty: 30 TABLET | Refills: 0 | OUTPATIENT
Start: 2021-12-02

## 2021-12-02 NOTE — TELEPHONE ENCOUNTER
Pt called office for Ativan refill.  Pt has scheduled annual appt for 01/18/22.   Medication pended.

## 2021-12-02 NOTE — TELEPHONE ENCOUNTER
This is a controlled substance.  Patient cannot have a refill for this medication since her last visit was in April

## 2022-01-18 ENCOUNTER — OFFICE VISIT (OUTPATIENT)
Dept: OBSTETRICS AND GYNECOLOGY | Facility: CLINIC | Age: 37
End: 2022-01-18

## 2022-01-18 VITALS
DIASTOLIC BLOOD PRESSURE: 78 MMHG | BODY MASS INDEX: 20.73 KG/M2 | HEIGHT: 66 IN | WEIGHT: 129 LBS | SYSTOLIC BLOOD PRESSURE: 106 MMHG

## 2022-01-18 DIAGNOSIS — Z01.419 WELL WOMAN EXAM WITH ROUTINE GYNECOLOGICAL EXAM: Primary | ICD-10-CM

## 2022-01-18 DIAGNOSIS — B37.31 CANDIDIASIS, VAGINA: ICD-10-CM

## 2022-01-18 DIAGNOSIS — Z78.9 NONSMOKER: ICD-10-CM

## 2022-01-18 DIAGNOSIS — Z92.29 COVID-19 VACCINE SERIES COMPLETED: ICD-10-CM

## 2022-01-18 PROCEDURE — 87512 GARDNER VAG DNA QUANT: CPT | Performed by: OBSTETRICS & GYNECOLOGY

## 2022-01-18 PROCEDURE — 87481 CANDIDA DNA AMP PROBE: CPT | Performed by: OBSTETRICS & GYNECOLOGY

## 2022-01-18 PROCEDURE — 99395 PREV VISIT EST AGE 18-39: CPT | Performed by: OBSTETRICS & GYNECOLOGY

## 2022-01-18 PROCEDURE — 87661 TRICHOMONAS VAGINALIS AMPLIF: CPT | Performed by: OBSTETRICS & GYNECOLOGY

## 2022-01-18 PROCEDURE — 87624 HPV HI-RISK TYP POOLED RSLT: CPT | Performed by: OBSTETRICS & GYNECOLOGY

## 2022-01-18 PROCEDURE — 87798 DETECT AGENT NOS DNA AMP: CPT | Performed by: OBSTETRICS & GYNECOLOGY

## 2022-01-18 PROCEDURE — G0123 SCREEN CERV/VAG THIN LAYER: HCPCS | Performed by: OBSTETRICS & GYNECOLOGY

## 2022-01-18 PROCEDURE — 87563 M. GENITALIUM AMP PROBE: CPT | Performed by: OBSTETRICS & GYNECOLOGY

## 2022-01-18 RX ORDER — FLUCONAZOLE 150 MG/1
150 TABLET ORAL DAILY
Qty: 1 TABLET | Refills: 0 | Status: SHIPPED | OUTPATIENT
Start: 2022-01-18 | End: 2022-10-26

## 2022-01-18 RX ORDER — VENLAFAXINE HYDROCHLORIDE 37.5 MG/1
37.5 CAPSULE, EXTENDED RELEASE ORAL DAILY
COMMUNITY
End: 2022-10-26

## 2022-01-18 NOTE — PROGRESS NOTES
"Subjective      Luda Nguyen is a 36 y.o. female who presents for her routine annual exam. Patient feeling well and feels lorenza to have avoided Covid this far.  Periods are spacing out a little bit lately, lasting 4-5 days. Dysmenorrhea:moderate, occurring first 1-2 days of flow - which she describes as being really heavy.  The rest of her cycle is light.   Cyclic symptoms include bloating and headache. No intermenstrual bleeding, spotting, or discharge.    Patient always feels herself ovulate and experiences that as back pain, for which she takes Aleve.    Patient had previously reported \"frequent\" yeast infections every 2-3 months.  This year has been better, but she does feel like she has one now.  Itching started over the weekend.      Patient previously on ativan years ago.  Patient has been taking Effexor since just before  and feels like it has really helped.    Current contraception: condoms.  Patient did not like taking OCP's because she reports that they made her feel bloated.  Regular self breast exam: yes, no concerns  History of abnormal Pap smear: no.  Most-recent normal was in   History of abnormal mammogram: no - patient cannot remember why she has had a mammogram in the past  Exercise: 2-3X per week, usually elliptical    Menstrual History:  OB History        2    Para   2    Term   2            AB        Living   3       SAB        IAB        Ectopic        Molar        Multiple   1    Live Births   3               Patient's last menstrual period was 2021 (exact date).       The following portions of the patient's history were reviewed and updated as appropriate: allergies, current medications, past family history, past medical history, past social history, past surgical history and problem list.    heterosexual    Family History  Family History   Problem Relation Age of Onset   • Lung cancer Maternal Grandmother    • Lung cancer Maternal Uncle        Review of " "Systems  Review of Systems   Constitutional: Negative for activity change and unexpected weight loss.   HENT: Negative for congestion.    Eyes: Positive for visual disturbance.   Respiratory: Negative for shortness of breath.    Cardiovascular: Negative for chest pain.   Gastrointestinal: Negative for abdominal pain, blood in stool, constipation and diarrhea.   Endocrine: Negative for cold intolerance and heat intolerance.   Genitourinary: Positive for vaginal discharge and vaginal pain (discharge). Negative for breast lump, breast pain, decreased libido (had been low in the past but reports normal now), difficulty urinating, dyspareunia, menstrual problem (heavy for just first day, but she does not feel like it needs treatment), pelvic pain and urinary incontinence.   Musculoskeletal: Negative for arthralgias, back pain (only during ovulation), neck pain and neck stiffness.   Skin: Negative for rash.   Neurological: Positive for headache (some months with menses). Negative for dizziness.   Psychiatric/Behavioral: Positive for sleep disturbance (racing thoughts). Negative for depressed mood. The patient is nervous/anxious (much better on Effexor, still taking Ativan prn).              Objective        /78   Ht 167.6 cm (66\")   Wt 58.5 kg (129 lb)   LMP 12/31/2021 (Exact Date)   BMI 20.82 kg/m²   Physical Exam   Constitutional: She is oriented to person, place, and time. She appears well-developed and well-nourished. No distress.   HENT:   Head: Normocephalic and atraumatic.   Cardiovascular: Normal rate and regular rhythm.   No murmur heard.  Pulmonary/Chest: Effort normal and breath sounds normal. Right breast exhibits no inverted nipple and no mass. Left breast exhibits no inverted nipple and no mass. No breast tenderness or discharge.   Abdominal: Soft. She exhibits no distension. There is no abdominal tenderness.   Genitourinary: Vagina normal and uterus normal. Pelvic exam was performed with patient " supine. There is no tenderness or lesion on the right labia. There is no tenderness or lesion on the left labia. Cervix does not exhibit motion tenderness, discharge or friability. Right adnexum displays no tenderness and no fullness. Left adnexum displays no tenderness and no fullness. No tenderness or bleeding in the vagina. No vaginal discharge found.   Musculoskeletal: Normal range of motion.   Neurological: She is alert and oriented to person, place, and time.   Skin: Skin is warm and dry.   Psychiatric: Her behavior is normal. Judgment normal.   Nursing note and vitals reviewed.            Assessment  & Plan      Luda was seen today for gynecologic exam.    Diagnoses and all orders for this visit:    Well woman exam with routine gynecological exam: Exam unremarkable.  PAP collected; patient has no history of abnormal Paps and her last normal one was in 2020.  Mammograms to start at 40.  Routine screening labs with PCP.  Regular SBE reported.  Periods are heavy for first day, and then become light.  Patient tried Lo Loestrin, but she did not like the way it made her feel; she has always felt bloated when taking combination OCP's  -     Liquid-based Pap Smear, Screening    Anxiety:Much better on Effexor, which she gets from her PCP.  Patient is still taking and as needed    BMI 20.0-20.9, adult: Normal    Nonsmoker    Patient is COVID vaccinated    This note was electronically signed.    Natasha Acosta MD  1/18/2022  10:10 CST

## 2022-01-20 LAB — HPV I/H RISK 4 DNA CVX QL PROBE+SIG AMP: NOT DETECTED

## 2022-01-21 LAB — TRICHOMONAS VAGINALIS PCR: NOT DETECTED

## 2022-01-24 LAB
GEN CATEG CVX/VAG CYTO-IMP: ABNORMAL
LAB AP CASE REPORT: ABNORMAL
LAB AP GYN ADDITIONAL INFORMATION: ABNORMAL
LAB AP GYN OTHER FINDINGS: ABNORMAL
PATH INTERP SPEC-IMP: ABNORMAL
STAT OF ADQ CVX/VAG CYTO-IMP: ABNORMAL

## 2022-09-08 ENCOUNTER — TRANSCRIBE ORDERS (OUTPATIENT)
Dept: ADMINISTRATIVE | Facility: HOSPITAL | Age: 37
End: 2022-09-08

## 2022-09-08 ENCOUNTER — LAB (OUTPATIENT)
Dept: LAB | Facility: HOSPITAL | Age: 37
End: 2022-09-08

## 2022-09-08 ENCOUNTER — HOSPITAL ENCOUNTER (OUTPATIENT)
Dept: ULTRASOUND IMAGING | Facility: HOSPITAL | Age: 37
Discharge: HOME | End: 2022-09-08

## 2022-09-08 DIAGNOSIS — R53.83 OTHER FATIGUE: ICD-10-CM

## 2022-09-08 DIAGNOSIS — R11.2 NAUSEA AND VOMITING, UNSPECIFIED VOMITING TYPE: ICD-10-CM

## 2022-09-08 DIAGNOSIS — R10.11 ABDOMINAL PAIN, RIGHT UPPER QUADRANT: ICD-10-CM

## 2022-09-08 DIAGNOSIS — R10.84 ABDOMINAL PAIN, GENERALIZED: ICD-10-CM

## 2022-09-08 DIAGNOSIS — R10.84 GENERALIZED ABDOMINAL PAIN: ICD-10-CM

## 2022-09-08 DIAGNOSIS — R10.11 RIGHT UPPER QUADRANT PAIN: ICD-10-CM

## 2022-09-08 LAB
ALBUMIN SERPL-MCNC: 4 G/DL (ref 3.5–5)
ALBUMIN/GLOB SERPL: 1.5 G/DL (ref 1.1–2.5)
ALP SERPL-CCNC: 82 U/L (ref 24–120)
ALT SERPL W P-5'-P-CCNC: 22 U/L (ref 0–35)
ANION GAP SERPL CALCULATED.3IONS-SCNC: 8 MMOL/L (ref 4–13)
AST SERPL-CCNC: 25 U/L (ref 7–45)
AUTO MIXED CELLS #: 0.9 10*3/MM3 (ref 0.1–2.6)
AUTO MIXED CELLS %: 14.5 % (ref 0.1–24)
BILIRUB SERPL-MCNC: 0.4 MG/DL (ref 0.1–1)
BUN SERPL-MCNC: 13 MG/DL (ref 5–21)
BUN/CREAT SERPL: 17.6
CALCIUM SPEC-SCNC: 8.4 MG/DL (ref 8.4–10.4)
CHLORIDE SERPL-SCNC: 102 MMOL/L (ref 98–110)
CO2 SERPL-SCNC: 29 MMOL/L (ref 24–31)
CREAT SERPL-MCNC: 0.74 MG/DL (ref 0.5–1.4)
CRP SERPL-MCNC: <0.3 MG/DL (ref 0–0.5)
EGFRCR SERPLBLD CKD-EPI 2021: 107.7 ML/MIN/1.73
ERYTHROCYTE [DISTWIDTH] IN BLOOD BY AUTOMATED COUNT: 13.3 % (ref 12.3–15.4)
ERYTHROCYTE [SEDIMENTATION RATE] IN BLOOD: 5 MM/HR (ref 0–20)
GLOBULIN UR ELPH-MCNC: 2.7 GM/DL
GLUCOSE SERPL-MCNC: 105 MG/DL (ref 70–100)
HCT VFR BLD AUTO: 39.3 % (ref 34–46.6)
HGB BLD-MCNC: 12.9 G/DL (ref 12–15.9)
LIPASE SERPL-CCNC: 229 U/L (ref 23–203)
LYMPHOCYTES # BLD AUTO: 1.6 10*3/MM3 (ref 0.7–3.1)
LYMPHOCYTES NFR BLD AUTO: 25.2 % (ref 19.6–45.3)
MCH RBC QN AUTO: 29.7 PG (ref 26.6–33)
MCHC RBC AUTO-ENTMCNC: 32.8 G/DL (ref 31.5–35.7)
MCV RBC AUTO: 90.6 FL (ref 79–97)
NEUTROPHILS NFR BLD AUTO: 3.9 10*3/MM3 (ref 1.7–7)
NEUTROPHILS NFR BLD AUTO: 60.3 % (ref 42.7–76)
PLATELET # BLD AUTO: 307 10*3/MM3 (ref 140–450)
PMV BLD AUTO: 8.9 FL (ref 6–12)
POTASSIUM SERPL-SCNC: 4.5 MMOL/L (ref 3.5–5.3)
PROT SERPL-MCNC: 6.7 G/DL (ref 6.3–8.7)
RBC # BLD AUTO: 4.34 10*6/MM3 (ref 3.77–5.28)
SODIUM SERPL-SCNC: 139 MMOL/L (ref 135–145)
WBC NRBC COR # BLD: 6.4 10*3/MM3 (ref 3.4–10.8)

## 2022-09-08 PROCEDURE — 86140 C-REACTIVE PROTEIN: CPT

## 2022-09-08 PROCEDURE — 76705 ECHO EXAM OF ABDOMEN: CPT

## 2022-09-08 PROCEDURE — 83690 ASSAY OF LIPASE: CPT

## 2022-09-08 PROCEDURE — 85025 COMPLETE CBC W/AUTO DIFF WBC: CPT

## 2022-09-08 PROCEDURE — 36415 COLL VENOUS BLD VENIPUNCTURE: CPT

## 2022-09-08 PROCEDURE — 80053 COMPREHEN METABOLIC PANEL: CPT

## 2022-09-08 PROCEDURE — 85652 RBC SED RATE AUTOMATED: CPT

## 2022-09-21 ENCOUNTER — TRANSCRIBE ORDERS (OUTPATIENT)
Dept: ADMINISTRATIVE | Facility: HOSPITAL | Age: 37
End: 2022-09-21

## 2022-09-21 DIAGNOSIS — R10.84 GENERALIZED ABDOMINAL PAIN: ICD-10-CM

## 2022-09-21 DIAGNOSIS — R11.2 NAUSEA AND VOMITING, UNSPECIFIED VOMITING TYPE: ICD-10-CM

## 2022-09-21 DIAGNOSIS — R10.11 RIGHT UPPER QUADRANT PAIN: Primary | ICD-10-CM

## 2022-10-05 ENCOUNTER — HOSPITAL ENCOUNTER (OUTPATIENT)
Dept: NUCLEAR MEDICINE | Facility: HOSPITAL | Age: 37
Discharge: HOME | End: 2022-10-05

## 2022-10-05 DIAGNOSIS — R10.84 GENERALIZED ABDOMINAL PAIN: ICD-10-CM

## 2022-10-05 DIAGNOSIS — R11.2 NAUSEA AND VOMITING, UNSPECIFIED VOMITING TYPE: ICD-10-CM

## 2022-10-05 DIAGNOSIS — R10.11 RIGHT UPPER QUADRANT PAIN: ICD-10-CM

## 2022-10-05 PROCEDURE — 0 TECHNETIUM TC 99M MEBROFENIN KIT: Performed by: NURSE PRACTITIONER

## 2022-10-05 PROCEDURE — A9537 TC99M MEBROFENIN: HCPCS | Performed by: NURSE PRACTITIONER

## 2022-10-05 PROCEDURE — 78226 HEPATOBILIARY SYSTEM IMAGING: CPT

## 2022-10-05 RX ORDER — KIT FOR THE PREPARATION OF TECHNETIUM TC 99M MEBROFENIN 45 MG/10ML
1 INJECTION, POWDER, LYOPHILIZED, FOR SOLUTION INTRAVENOUS
Status: COMPLETED | OUTPATIENT
Start: 2022-10-05 | End: 2022-10-05

## 2022-10-05 RX ADMIN — MEBROFENIN 1 DOSE: 45 INJECTION, POWDER, LYOPHILIZED, FOR SOLUTION INTRAVENOUS at 13:05

## 2022-10-25 NOTE — PROGRESS NOTES
"Office New Patient History and Physical:     Referring Provider: Bob Merritt APRN    Chief Complaint   Patient presents with   • Abdominal Pain       Subjective .     History of present illness:  Luda Nguyen is a 36 y.o. female with years of nausea, vomiting and upper abdominal pain. This significantly worsened this summer and has not improved. She reports 2-3 attacks with RUQ and epigastric pain with severe bloating. She endorses nausea and vomiting with the attacks. No yellowing of the skin or eyes.     Her BMI is normal at 20.98. PSH includes  x 2.  She is a non-smoker. She is not on blood thinners.     History  Past Medical History:   Diagnosis Date   • Anxiety    • Patient denies medical problems    ,   Past Surgical History:   Procedure Laterality Date   •  SECTION     •  SECTION N/A 2017    Procedure:  SECTION REPEAT;  Surgeon: Kelly Meléndez DO;  Location: [facility] LABOR DELIVERY;  Service:    • FOOT SURGERY      left   • WISDOM TOOTH EXTRACTION     ,   Family History   Problem Relation Age of Onset   • Lung cancer Maternal Grandmother    • Lung cancer Maternal Uncle    ,   Social History     Tobacco Use   • Smoking status: Never   • Smokeless tobacco: Never   Substance Use Topics   • Alcohol use: Yes     Comment: occ   • Drug use: No   , (Not in a hospital admission)   and Allergies:  Patient has no known allergies.    Current Outpatient Medications:   •  fexofenadine (ALLEGRA) 60 MG tablet, Take 60 mg by mouth Daily., Disp: , Rfl:   •  LORazepam (Ativan) 1 MG tablet, Take 1 tablet by mouth 2 (Two) Times a Day As Needed for Anxiety., Disp: 30 tablet, Rfl: 2  •  Multiple Vitamins-Minerals (MULTIVITAMIN ADULT PO), Take  by mouth., Disp: , Rfl:   •  traZODone (DESYREL) 50 MG tablet, , Disp: , Rfl:     Objective     Vital Signs   Ht 167.6 cm (66\")   Wt 59 kg (130 lb)   BMI 20.98 kg/m²      Physical Exam:  General appearance - alert, well appearing, and in " no distress  Mental status - alert, oriented to person, place, and time  Eyes - pupils equal and reactive, extraocular eye movements intact  Neck - supple, no significant adenopathy  Chest - no tachypnea, retractions or cyanosis  Abdomen - soft, nontender, nondistended, no masses or organomegaly  Neurological - alert, oriented, normal speech, no focal findings or movement disorder noted  Musculoskeletal - no joint tenderness, deformity or swelling    Physical Exam  Abdominal:               Results Review:    The following data was reviewed by: Coco Garnica MD on 10/26/2022:    US Abdomen Limited (09/08/2022 10:56)  No findings to explain abdominal pain. No gallstones or biliary ductal dilatation.  NM HIDA SCAN WITHOUT PHARMACOLOGICAL INTERVENTION (10/05/2022 15:22)  1. The gallbladder is normally faintly visualized during the examination and appears to fill slowly.  2. Low gallbladder ejection fraction of 16%.  CBC & Differential (09/08/2022 10:44)  Comprehensive Metabolic Panel (09/08/2022 10:44)  LFTs normal   Lipase (09/08/2022 10:44)  Elevated at 229   REFERRAL/PRE-AUTH MRN - SCAN - REFERRAL_ MAREN MANPREET APRN_ 10/10/2022 (10/17/2022)    Assessment & Plan       Diagnoses and all orders for this visit:    1. Biliary dyskinesia (Primary)  Overview:  Added automatically from request for surgery [id number]    Orders:  -     Case Request; Standing  -     CBC and Differential; Future  -     Comprehensive metabolic panel; Future  -     ceFAZolin (ANCEF) 2 g in sodium chloride 0.9 % 100 mL IVPB  -     indocyanine green (IC-GREEN) injection 1.25 mg  -     heparin (porcine) 5000 UNIT/ML injection 5,000 Units  -     Case Request    Other orders  -     Follow Anesthesia Guidelines / Protocol; Future  -     Obtain Informed Consent; Future  -     Provide NPO Instructions to Patient; Future  -     Chlorhexidine Skin Prep; Future  -     Follow Anesthesia Guidelines / Protocol; Standing  -     Verify / Perform Chlorhexidine  Skin Prep; Standing  -     Verify / Perform Chlorhexidine Skin Prep if Indicated (If Not Already Completed); Standing  -     Instructions on coughing, deep breathing, and incentive spirometry.; Standing  -     Oxygen Therapy-; Standing  -     Notify physician (specify); Standing       Luda Nguyen is a 36 y.o. female with biliary dyskinesia 2/2 low ejection fraction. History and imaging (I have reviewed her US, HIDA and labs above) above are consistent with this diagnosis. I did  the patient that there is a small chance that cholecystectomy does not completely resolve the pain, and that if this is the case, we will refer the patient to GI for an EGD. However, the symptoms including their nature, timing and duration are most consistent with biliary dsykinesia. I reviewed the gallbladder anatomy with the patient, and after a thorough discussion of risks (including bleeding, infection, bile leak, damage to surrounding structures including the common bile duct, and risks of anesthesia) and benefits, the patient wishes to proceed with laparoscopic cholecystectomy, possible cholangiogram. The patient has an appointment for pre-op work up including CMP and CBC.  The patient is currently scheduled for laparoscopic cholecystectomy, possible cholangiogram on 12/20/22.    I also discussed with the patient post operative pain management including multimodal pain control utilizing Tylenol, ibuprofen, and Roxicodone for breakthrough pain. I will plan to give the patient 10 tabs of 5mg Roxicodone post operatively for breakthrough pain    BMI is within normal parameters. No other follow-up for BMI required.      Coco Garnica MD  10/26/22  10:06 CDT

## 2022-10-26 ENCOUNTER — OFFICE VISIT (OUTPATIENT)
Dept: SURGERY | Facility: CLINIC | Age: 37
End: 2022-10-26

## 2022-10-26 VITALS — HEIGHT: 66 IN | BODY MASS INDEX: 20.89 KG/M2 | WEIGHT: 130 LBS

## 2022-10-26 DIAGNOSIS — K82.8 BILIARY DYSKINESIA: Primary | ICD-10-CM

## 2022-10-26 PROCEDURE — 99204 OFFICE O/P NEW MOD 45 MIN: CPT | Performed by: STUDENT IN AN ORGANIZED HEALTH CARE EDUCATION/TRAINING PROGRAM

## 2022-10-26 RX ORDER — INDOCYANINE GREEN AND WATER 25 MG
1.25 KIT INJECTION ONCE
Status: CANCELLED | OUTPATIENT
Start: 2022-10-26 | End: 2022-10-26

## 2022-10-26 RX ORDER — TRAZODONE HYDROCHLORIDE 50 MG/1
50 TABLET ORAL NIGHTLY PRN
COMMUNITY
Start: 2022-10-24

## 2022-10-26 RX ORDER — HEPARIN SODIUM 5000 [USP'U]/ML
5000 INJECTION, SOLUTION INTRAVENOUS; SUBCUTANEOUS ONCE
Status: CANCELLED | OUTPATIENT
Start: 2022-10-26 | End: 2022-10-26

## 2022-10-26 NOTE — PATIENT INSTRUCTIONS
Surgery is scheduled for 12/20 at 0500am  Prework is scheduled for 12/13 at 1015am  Nothing to eat or drink after midnight before surgery.  No Aspirin, Vitamins or Blood Thinners 5 days prior to surgery.  Please report to the hospital main registation for check in on both days.

## 2022-12-13 ENCOUNTER — PRE-ADMISSION TESTING (OUTPATIENT)
Dept: PREADMISSION TESTING | Facility: HOSPITAL | Age: 37
End: 2022-12-13

## 2022-12-13 VITALS
OXYGEN SATURATION: 98 % | BODY MASS INDEX: 21.66 KG/M2 | HEIGHT: 67 IN | RESPIRATION RATE: 18 BRPM | WEIGHT: 138.01 LBS | HEART RATE: 74 BPM | DIASTOLIC BLOOD PRESSURE: 78 MMHG | SYSTOLIC BLOOD PRESSURE: 123 MMHG

## 2022-12-13 DIAGNOSIS — K82.8 BILIARY DYSKINESIA: ICD-10-CM

## 2022-12-13 LAB
ALBUMIN SERPL-MCNC: 4.3 G/DL (ref 3.5–5.2)
ALBUMIN/GLOB SERPL: 1.8 G/DL
ALP SERPL-CCNC: 62 U/L (ref 39–117)
ALT SERPL W P-5'-P-CCNC: 15 U/L (ref 1–33)
ANION GAP SERPL CALCULATED.3IONS-SCNC: 8 MMOL/L (ref 5–15)
AST SERPL-CCNC: 20 U/L (ref 1–32)
BASOPHILS # BLD AUTO: 0.02 10*3/MM3 (ref 0–0.2)
BASOPHILS NFR BLD AUTO: 0.3 % (ref 0–1.5)
BILIRUB SERPL-MCNC: <0.2 MG/DL (ref 0–1.2)
BUN SERPL-MCNC: 14 MG/DL (ref 6–20)
BUN/CREAT SERPL: 22.6 (ref 7–25)
CALCIUM SPEC-SCNC: 9.1 MG/DL (ref 8.6–10.5)
CHLORIDE SERPL-SCNC: 104 MMOL/L (ref 98–107)
CO2 SERPL-SCNC: 29 MMOL/L (ref 22–29)
CREAT SERPL-MCNC: 0.62 MG/DL (ref 0.57–1)
DEPRECATED RDW RBC AUTO: 42.9 FL (ref 37–54)
EGFRCR SERPLBLD CKD-EPI 2021: 117.8 ML/MIN/1.73
EOSINOPHIL # BLD AUTO: 0.17 10*3/MM3 (ref 0–0.4)
EOSINOPHIL NFR BLD AUTO: 2.8 % (ref 0.3–6.2)
ERYTHROCYTE [DISTWIDTH] IN BLOOD BY AUTOMATED COUNT: 12.7 % (ref 12.3–15.4)
GLOBULIN UR ELPH-MCNC: 2.4 GM/DL
GLUCOSE SERPL-MCNC: 90 MG/DL (ref 65–99)
HCT VFR BLD AUTO: 38 % (ref 34–46.6)
HGB BLD-MCNC: 11.8 G/DL (ref 12–15.9)
IMM GRANULOCYTES # BLD AUTO: 0.01 10*3/MM3 (ref 0–0.05)
IMM GRANULOCYTES NFR BLD AUTO: 0.2 % (ref 0–0.5)
LYMPHOCYTES # BLD AUTO: 1.78 10*3/MM3 (ref 0.7–3.1)
LYMPHOCYTES NFR BLD AUTO: 29.6 % (ref 19.6–45.3)
MCH RBC QN AUTO: 28.5 PG (ref 26.6–33)
MCHC RBC AUTO-ENTMCNC: 31.1 G/DL (ref 31.5–35.7)
MCV RBC AUTO: 91.8 FL (ref 79–97)
MONOCYTES # BLD AUTO: 0.37 10*3/MM3 (ref 0.1–0.9)
MONOCYTES NFR BLD AUTO: 6.2 % (ref 5–12)
NEUTROPHILS NFR BLD AUTO: 3.66 10*3/MM3 (ref 1.7–7)
NEUTROPHILS NFR BLD AUTO: 60.9 % (ref 42.7–76)
NRBC BLD AUTO-RTO: 0 /100 WBC (ref 0–0.2)
PLATELET # BLD AUTO: 265 10*3/MM3 (ref 140–450)
PMV BLD AUTO: 10 FL (ref 6–12)
POTASSIUM SERPL-SCNC: 4.2 MMOL/L (ref 3.5–5.2)
PROT SERPL-MCNC: 6.7 G/DL (ref 6–8.5)
RBC # BLD AUTO: 4.14 10*6/MM3 (ref 3.77–5.28)
SODIUM SERPL-SCNC: 141 MMOL/L (ref 136–145)
WBC NRBC COR # BLD: 6.01 10*3/MM3 (ref 3.4–10.8)

## 2022-12-13 PROCEDURE — 36415 COLL VENOUS BLD VENIPUNCTURE: CPT

## 2022-12-13 PROCEDURE — 85025 COMPLETE CBC W/AUTO DIFF WBC: CPT

## 2022-12-13 PROCEDURE — 80053 COMPREHEN METABOLIC PANEL: CPT

## 2022-12-13 RX ORDER — LORATADINE 10 MG/1
10 TABLET ORAL DAILY
COMMUNITY

## 2022-12-20 ENCOUNTER — ANESTHESIA (OUTPATIENT)
Dept: PERIOP | Facility: HOSPITAL | Age: 37
End: 2022-12-20

## 2022-12-20 ENCOUNTER — ANESTHESIA EVENT (OUTPATIENT)
Dept: PERIOP | Facility: HOSPITAL | Age: 37
End: 2022-12-20

## 2022-12-20 ENCOUNTER — HOSPITAL ENCOUNTER (OUTPATIENT)
Facility: HOSPITAL | Age: 37
Setting detail: HOSPITAL OUTPATIENT SURGERY
Discharge: HOME | End: 2022-12-20
Attending: STUDENT IN AN ORGANIZED HEALTH CARE EDUCATION/TRAINING PROGRAM | Admitting: STUDENT IN AN ORGANIZED HEALTH CARE EDUCATION/TRAINING PROGRAM

## 2022-12-20 VITALS
HEART RATE: 64 BPM | SYSTOLIC BLOOD PRESSURE: 98 MMHG | TEMPERATURE: 97.3 F | RESPIRATION RATE: 16 BRPM | DIASTOLIC BLOOD PRESSURE: 62 MMHG | OXYGEN SATURATION: 94 %

## 2022-12-20 DIAGNOSIS — K82.8 BILIARY DYSKINESIA: ICD-10-CM

## 2022-12-20 LAB — B-HCG UR QL: NEGATIVE

## 2022-12-20 PROCEDURE — 81025 URINE PREGNANCY TEST: CPT | Performed by: STUDENT IN AN ORGANIZED HEALTH CARE EDUCATION/TRAINING PROGRAM

## 2022-12-20 PROCEDURE — 25010000002 DEXAMETHASONE PER 1 MG: Performed by: STUDENT IN AN ORGANIZED HEALTH CARE EDUCATION/TRAINING PROGRAM

## 2022-12-20 PROCEDURE — 25010000002 MORPHINE SULFATE (PF) 2 MG/ML SOLUTION 1 ML CARTRIDGE: Performed by: STUDENT IN AN ORGANIZED HEALTH CARE EDUCATION/TRAINING PROGRAM

## 2022-12-20 PROCEDURE — 25010000002 DEXAMETHASONE PER 1 MG: Performed by: ANESTHESIOLOGY

## 2022-12-20 PROCEDURE — 25010000002 HEPARIN (PORCINE) PER 1000 UNITS: Performed by: STUDENT IN AN ORGANIZED HEALTH CARE EDUCATION/TRAINING PROGRAM

## 2022-12-20 PROCEDURE — 25010000002 DEXAMETHASONE PER 1 MG: Performed by: NURSE ANESTHETIST, CERTIFIED REGISTERED

## 2022-12-20 PROCEDURE — S0260 H&P FOR SURGERY: HCPCS | Performed by: STUDENT IN AN ORGANIZED HEALTH CARE EDUCATION/TRAINING PROGRAM

## 2022-12-20 PROCEDURE — 47562 LAPAROSCOPIC CHOLECYSTECTOMY: CPT | Performed by: STUDENT IN AN ORGANIZED HEALTH CARE EDUCATION/TRAINING PROGRAM

## 2022-12-20 PROCEDURE — 0 LIDOCAINE 1 % SOLUTION 20 ML VIAL: Performed by: STUDENT IN AN ORGANIZED HEALTH CARE EDUCATION/TRAINING PROGRAM

## 2022-12-20 PROCEDURE — 25010000002 CEFAZOLIN PER 500 MG: Performed by: STUDENT IN AN ORGANIZED HEALTH CARE EDUCATION/TRAINING PROGRAM

## 2022-12-20 PROCEDURE — 25010000002 FENTANYL CITRATE (PF) 100 MCG/2ML SOLUTION: Performed by: NURSE ANESTHETIST, CERTIFIED REGISTERED

## 2022-12-20 PROCEDURE — 25010000002 MIDAZOLAM PER 1 MG: Performed by: ANESTHESIOLOGY

## 2022-12-20 PROCEDURE — 25010000002 DROPERIDOL PER 5 MG: Performed by: ANESTHESIOLOGY

## 2022-12-20 PROCEDURE — 88304 TISSUE EXAM BY PATHOLOGIST: CPT | Performed by: STUDENT IN AN ORGANIZED HEALTH CARE EDUCATION/TRAINING PROGRAM

## 2022-12-20 PROCEDURE — 25010000002 PROPOFOL 10 MG/ML EMULSION: Performed by: NURSE ANESTHETIST, CERTIFIED REGISTERED

## 2022-12-20 PROCEDURE — 25010000002 ONDANSETRON PER 1 MG: Performed by: NURSE ANESTHETIST, CERTIFIED REGISTERED

## 2022-12-20 DEVICE — LIGACLIP 10-M/L, 10MM ENDOSCOPIC ROTATING MULTIPLE CLIP APPLIERS
Type: IMPLANTABLE DEVICE | Site: ABDOMEN | Status: FUNCTIONAL
Brand: LIGACLIP

## 2022-12-20 RX ORDER — SODIUM CHLORIDE, SODIUM LACTATE, POTASSIUM CHLORIDE, CALCIUM CHLORIDE 600; 310; 30; 20 MG/100ML; MG/100ML; MG/100ML; MG/100ML
1000 INJECTION, SOLUTION INTRAVENOUS CONTINUOUS
Status: DISCONTINUED | OUTPATIENT
Start: 2022-12-20 | End: 2022-12-20 | Stop reason: HOSPADM

## 2022-12-20 RX ORDER — OXYCODONE HYDROCHLORIDE 5 MG/1
5 TABLET ORAL EVERY 8 HOURS PRN
Qty: 10 TABLET | Refills: 0 | Status: SHIPPED | OUTPATIENT
Start: 2022-12-20 | End: 2023-12-20

## 2022-12-20 RX ORDER — LIDOCAINE HYDROCHLORIDE 10 MG/ML
0.5 INJECTION, SOLUTION EPIDURAL; INFILTRATION; INTRACAUDAL; PERINEURAL ONCE AS NEEDED
Status: DISCONTINUED | OUTPATIENT
Start: 2022-12-20 | End: 2022-12-20 | Stop reason: HOSPADM

## 2022-12-20 RX ORDER — FLUMAZENIL 0.1 MG/ML
0.2 INJECTION INTRAVENOUS AS NEEDED
Status: DISCONTINUED | OUTPATIENT
Start: 2022-12-20 | End: 2022-12-20 | Stop reason: HOSPADM

## 2022-12-20 RX ORDER — PROPOFOL 10 MG/ML
VIAL (ML) INTRAVENOUS AS NEEDED
Status: DISCONTINUED | OUTPATIENT
Start: 2022-12-20 | End: 2022-12-20 | Stop reason: SURG

## 2022-12-20 RX ORDER — FENTANYL CITRATE 50 UG/ML
25 INJECTION, SOLUTION INTRAMUSCULAR; INTRAVENOUS
Status: DISCONTINUED | OUTPATIENT
Start: 2022-12-20 | End: 2022-12-20 | Stop reason: HOSPADM

## 2022-12-20 RX ORDER — IBUPROFEN 200 MG
600 TABLET ORAL EVERY 8 HOURS
Qty: 100 TABLET | Refills: 2
Start: 2022-12-20 | End: 2023-12-20

## 2022-12-20 RX ORDER — DEXAMETHASONE SODIUM PHOSPHATE 4 MG/ML
4 INJECTION, SOLUTION INTRA-ARTICULAR; INTRALESIONAL; INTRAMUSCULAR; INTRAVENOUS; SOFT TISSUE ONCE AS NEEDED
Status: COMPLETED | OUTPATIENT
Start: 2022-12-20 | End: 2022-12-20

## 2022-12-20 RX ORDER — LABETALOL HYDROCHLORIDE 5 MG/ML
5 INJECTION, SOLUTION INTRAVENOUS
Status: DISCONTINUED | OUTPATIENT
Start: 2022-12-20 | End: 2022-12-20 | Stop reason: HOSPADM

## 2022-12-20 RX ORDER — SODIUM CHLORIDE 0.9 % (FLUSH) 0.9 %
3 SYRINGE (ML) INJECTION AS NEEDED
Status: DISCONTINUED | OUTPATIENT
Start: 2022-12-20 | End: 2022-12-20 | Stop reason: HOSPADM

## 2022-12-20 RX ORDER — BUPIVACAINE HCL/0.9 % NACL/PF 0.125 %
PLASTIC BAG, INJECTION (ML) EPIDURAL AS NEEDED
Status: DISCONTINUED | OUTPATIENT
Start: 2022-12-20 | End: 2022-12-20 | Stop reason: SURG

## 2022-12-20 RX ORDER — DROPERIDOL 2.5 MG/ML
0.62 INJECTION, SOLUTION INTRAMUSCULAR; INTRAVENOUS ONCE AS NEEDED
Status: COMPLETED | OUTPATIENT
Start: 2022-12-20 | End: 2022-12-20

## 2022-12-20 RX ORDER — HEPARIN SODIUM 5000 [USP'U]/ML
5000 INJECTION, SOLUTION INTRAVENOUS; SUBCUTANEOUS ONCE
Status: COMPLETED | OUTPATIENT
Start: 2022-12-20 | End: 2022-12-20

## 2022-12-20 RX ORDER — MAGNESIUM HYDROXIDE 1200 MG/15ML
LIQUID ORAL AS NEEDED
Status: DISCONTINUED | OUTPATIENT
Start: 2022-12-20 | End: 2022-12-20 | Stop reason: HOSPADM

## 2022-12-20 RX ORDER — SODIUM CHLORIDE 0.9 % (FLUSH) 0.9 %
3 SYRINGE (ML) INJECTION EVERY 12 HOURS SCHEDULED
Status: DISCONTINUED | OUTPATIENT
Start: 2022-12-20 | End: 2022-12-20 | Stop reason: HOSPADM

## 2022-12-20 RX ORDER — ROCURONIUM BROMIDE 10 MG/ML
INJECTION, SOLUTION INTRAVENOUS AS NEEDED
Status: DISCONTINUED | OUTPATIENT
Start: 2022-12-20 | End: 2022-12-20 | Stop reason: SURG

## 2022-12-20 RX ORDER — ONDANSETRON 4 MG/1
4 TABLET, FILM COATED ORAL EVERY 8 HOURS PRN
Qty: 15 TABLET | Refills: 0 | Status: SHIPPED | OUTPATIENT
Start: 2022-12-20 | End: 2023-12-20

## 2022-12-20 RX ORDER — MIDAZOLAM HYDROCHLORIDE 1 MG/ML
2 INJECTION INTRAMUSCULAR; INTRAVENOUS
Status: DISCONTINUED | OUTPATIENT
Start: 2022-12-20 | End: 2022-12-20 | Stop reason: HOSPADM

## 2022-12-20 RX ORDER — SODIUM CHLORIDE, SODIUM LACTATE, POTASSIUM CHLORIDE, CALCIUM CHLORIDE 600; 310; 30; 20 MG/100ML; MG/100ML; MG/100ML; MG/100ML
100 INJECTION, SOLUTION INTRAVENOUS CONTINUOUS
Status: DISCONTINUED | OUTPATIENT
Start: 2022-12-20 | End: 2022-12-20 | Stop reason: HOSPADM

## 2022-12-20 RX ORDER — OXYCODONE AND ACETAMINOPHEN 10; 325 MG/1; MG/1
1 TABLET ORAL ONCE AS NEEDED
Status: DISCONTINUED | OUTPATIENT
Start: 2022-12-20 | End: 2022-12-20 | Stop reason: HOSPADM

## 2022-12-20 RX ORDER — INDOCYANINE GREEN AND WATER 25 MG
1.25 KIT INJECTION ONCE
Status: COMPLETED | OUTPATIENT
Start: 2022-12-20 | End: 2022-12-20

## 2022-12-20 RX ORDER — ACETAMINOPHEN 500 MG
1000 TABLET ORAL ONCE
Status: COMPLETED | OUTPATIENT
Start: 2022-12-20 | End: 2022-12-20

## 2022-12-20 RX ORDER — SCOLOPAMINE TRANSDERMAL SYSTEM 1 MG/1
1 PATCH, EXTENDED RELEASE TRANSDERMAL ONCE
Status: DISCONTINUED | OUTPATIENT
Start: 2022-12-20 | End: 2022-12-20 | Stop reason: HOSPADM

## 2022-12-20 RX ORDER — FENTANYL CITRATE 50 UG/ML
INJECTION, SOLUTION INTRAMUSCULAR; INTRAVENOUS AS NEEDED
Status: DISCONTINUED | OUTPATIENT
Start: 2022-12-20 | End: 2022-12-20 | Stop reason: SURG

## 2022-12-20 RX ORDER — SODIUM CHLORIDE 0.9 % (FLUSH) 0.9 %
3-10 SYRINGE (ML) INJECTION AS NEEDED
Status: DISCONTINUED | OUTPATIENT
Start: 2022-12-20 | End: 2022-12-20 | Stop reason: HOSPADM

## 2022-12-20 RX ORDER — NEOSTIGMINE METHYLSULFATE 5 MG/5 ML
SYRINGE (ML) INTRAVENOUS AS NEEDED
Status: DISCONTINUED | OUTPATIENT
Start: 2022-12-20 | End: 2022-12-20 | Stop reason: SURG

## 2022-12-20 RX ORDER — SODIUM CHLORIDE 9 MG/ML
INJECTION, SOLUTION INTRAVENOUS AS NEEDED
Status: DISCONTINUED | OUTPATIENT
Start: 2022-12-20 | End: 2022-12-20 | Stop reason: HOSPADM

## 2022-12-20 RX ORDER — DEXAMETHASONE SODIUM PHOSPHATE 4 MG/ML
INJECTION, SOLUTION INTRA-ARTICULAR; INTRALESIONAL; INTRAMUSCULAR; INTRAVENOUS; SOFT TISSUE AS NEEDED
Status: DISCONTINUED | OUTPATIENT
Start: 2022-12-20 | End: 2022-12-20 | Stop reason: SURG

## 2022-12-20 RX ORDER — ONDANSETRON 2 MG/ML
INJECTION INTRAMUSCULAR; INTRAVENOUS AS NEEDED
Status: DISCONTINUED | OUTPATIENT
Start: 2022-12-20 | End: 2022-12-20 | Stop reason: SURG

## 2022-12-20 RX ORDER — ACETAMINOPHEN 325 MG/1
975 TABLET ORAL EVERY 8 HOURS
Qty: 100 TABLET | Refills: 2
Start: 2022-12-20 | End: 2023-12-20

## 2022-12-20 RX ORDER — ONDANSETRON 2 MG/ML
4 INJECTION INTRAMUSCULAR; INTRAVENOUS ONCE AS NEEDED
Status: DISCONTINUED | OUTPATIENT
Start: 2022-12-20 | End: 2022-12-20 | Stop reason: HOSPADM

## 2022-12-20 RX ORDER — IBUPROFEN 600 MG/1
600 TABLET ORAL ONCE AS NEEDED
Status: DISCONTINUED | OUTPATIENT
Start: 2022-12-20 | End: 2022-12-20 | Stop reason: HOSPADM

## 2022-12-20 RX ORDER — SODIUM CHLORIDE 9 MG/ML
40 INJECTION, SOLUTION INTRAVENOUS AS NEEDED
Status: DISCONTINUED | OUTPATIENT
Start: 2022-12-20 | End: 2022-12-20 | Stop reason: HOSPADM

## 2022-12-20 RX ORDER — BUPIVACAINE HCL/0.9 % NACL/PF 0.1 %
2 PLASTIC BAG, INJECTION (ML) EPIDURAL ONCE
Status: COMPLETED | OUTPATIENT
Start: 2022-12-20 | End: 2022-12-20

## 2022-12-20 RX ORDER — OXYCODONE AND ACETAMINOPHEN 7.5; 325 MG/1; MG/1
2 TABLET ORAL EVERY 4 HOURS PRN
Status: DISCONTINUED | OUTPATIENT
Start: 2022-12-20 | End: 2022-12-20 | Stop reason: HOSPADM

## 2022-12-20 RX ORDER — LIDOCAINE HYDROCHLORIDE 20 MG/ML
INJECTION, SOLUTION EPIDURAL; INFILTRATION; INTRACAUDAL; PERINEURAL AS NEEDED
Status: DISCONTINUED | OUTPATIENT
Start: 2022-12-20 | End: 2022-12-20 | Stop reason: SURG

## 2022-12-20 RX ORDER — NALOXONE HCL 0.4 MG/ML
0.4 VIAL (ML) INJECTION AS NEEDED
Status: DISCONTINUED | OUTPATIENT
Start: 2022-12-20 | End: 2022-12-20 | Stop reason: HOSPADM

## 2022-12-20 RX ADMIN — ROCURONIUM BROMIDE 30 MG: 10 SOLUTION INTRAVENOUS at 07:04

## 2022-12-20 RX ADMIN — MIDAZOLAM HYDROCHLORIDE 2 MG: 2 INJECTION, SOLUTION INTRAMUSCULAR; INTRAVENOUS at 06:43

## 2022-12-20 RX ADMIN — SCOPALAMINE 1 PATCH: 1 PATCH, EXTENDED RELEASE TRANSDERMAL at 06:38

## 2022-12-20 RX ADMIN — DEXAMETHASONE SODIUM PHOSPHATE 4 MG: 4 INJECTION INTRA-ARTICULAR; INTRALESIONAL; INTRAMUSCULAR; INTRAVENOUS; SOFT TISSUE at 06:38

## 2022-12-20 RX ADMIN — Medication 100 MCG: at 07:24

## 2022-12-20 RX ADMIN — GLYCOPYRROLATE 0.4 MG: 0.2 INJECTION INTRAMUSCULAR; INTRAVENOUS at 07:32

## 2022-12-20 RX ADMIN — ACETAMINOPHEN 1000 MG: 500 TABLET ORAL at 06:38

## 2022-12-20 RX ADMIN — FENTANYL CITRATE 100 MCG: 50 INJECTION, SOLUTION INTRAMUSCULAR; INTRAVENOUS at 07:04

## 2022-12-20 RX ADMIN — SODIUM CHLORIDE, POTASSIUM CHLORIDE, SODIUM LACTATE AND CALCIUM CHLORIDE 1000 ML: 600; 310; 30; 20 INJECTION, SOLUTION INTRAVENOUS at 06:13

## 2022-12-20 RX ADMIN — ONDANSETRON 4 MG: 2 INJECTION INTRAMUSCULAR; INTRAVENOUS at 07:30

## 2022-12-20 RX ADMIN — DEXAMETHASONE SODIUM PHOSPHATE 4 MG: 4 INJECTION, SOLUTION INTRA-ARTICULAR; INTRALESIONAL; INTRAMUSCULAR; INTRAVENOUS; SOFT TISSUE at 07:30

## 2022-12-20 RX ADMIN — PROPOFOL 200 MG: 10 INJECTION, EMULSION INTRAVENOUS at 07:04

## 2022-12-20 RX ADMIN — LIDOCAINE HYDROCHLORIDE 60 MG: 20 INJECTION, SOLUTION EPIDURAL; INFILTRATION; INTRACAUDAL; PERINEURAL at 07:04

## 2022-12-20 RX ADMIN — DROPERIDOL 0.62 MG: 2.5 INJECTION, SOLUTION INTRAMUSCULAR; INTRAVENOUS at 08:11

## 2022-12-20 RX ADMIN — INDOCYANINE GREEN AND WATER 1.25 MG: KIT at 06:46

## 2022-12-20 RX ADMIN — HEPARIN SODIUM 5000 UNITS: 5000 INJECTION, SOLUTION INTRAVENOUS; SUBCUTANEOUS at 06:38

## 2022-12-20 RX ADMIN — Medication 3 MG: at 07:32

## 2022-12-20 RX ADMIN — Medication 2 G: at 07:13

## 2022-12-20 NOTE — ANESTHESIA POSTPROCEDURE EVALUATION
Patient: Luda Nguyen    Procedure Summary     Date: 12/20/22 Room / Location:  [facility] /  [facility]    Anesthesia Start: 0700 Anesthesia Stop: 0742    Procedure: CHOLECYSTECTOMY LAPAROSCOPIC WITH  PRE-OPERATIVE ICG INJECTION (Abdomen) Diagnosis:       Biliary dyskinesia      (Biliary dyskinesia [K82.8])    Surgeons: Coco Garnica MD Provider: Ginger Carter CRNA    Anesthesia Type: general ASA Status: 1          Anesthesia Type: general    Vitals  Vitals Value Taken Time   BP 99/63 12/20/22 0824   Temp 97.3 °F (36.3 °C) 12/20/22 0815   Pulse 60 12/20/22 0824   Resp 16 12/20/22 0824   SpO2 97 % 12/20/22 0824           Post Anesthesia Care and Evaluation    Patient location during evaluation: PACU  Patient participation: complete - patient participated  Level of consciousness: awake and alert  Pain management: adequate    Airway patency: patent  Anesthetic complications: No anesthetic complications    Cardiovascular status: acceptable  Respiratory status: acceptable  Hydration status: acceptable    Comments: Blood pressure 98/62, pulse 64, temperature 97.3 °F (36.3 °C), resp. rate 16, last menstrual period 12/06/2022, SpO2 94 %, not currently breastfeeding.    Pt discharged from PACU based on em score >8

## 2022-12-20 NOTE — H&P
Coco Garnica MD - General Surgery History and Physical     Referring Provider: Coco Garnica MD    Patient Care Team:  Anthony Millan MD as PCP - General (Internal Medicine)  Coco Garnica MD as Consulting Physician (General Surgery)    Chief complaint abdominal pain     Subjective .     History of present illness:  The patient is a 37 y.o. female who presented to my office with nausea, vomiting, and upper abdominal pain. The episodes are worsening. She has had 2-3 attacks with RUQ/epigastric pain and severe bloating as well as nausea/vomiting. No yellowing of the skin or eyes.     BMI 21. PSH  x 2. Non-smoker. No blood thinners.     Review of Systems    Review of Systems - General ROS: negative for - chills, fatigue or fever  ENT ROS: negative for - epistaxis, headaches or hearing change  Respiratory ROS: no cough, shortness of breath, or wheezing  Cardiovascular ROS: no chest pain or dyspnea on exertion  Gastrointestinal ROS: positive for - abdominal pain and appetite loss  Genito-Urinary ROS: no dysuria, trouble voiding, or hematuria  Dermatological ROS: negative   Breast ROS: negative for breast lumps  Hematological and Lymphatic ROS: negative for - bleeding problems, blood clots or blood transfusions  Musculoskeletal ROS: negative for - gait disturbance or joint stiffness   Neurological ROS: no TIA or stroke symptoms    Psychological ROS: negative  Endocrine ROS: negative    History  Past Medical History:   Diagnosis Date   • Anxiety    • Insomnia    • Patient denies medical problems    • PONV (postoperative nausea and vomiting)    • Spinal headache    ,   Past Surgical History:   Procedure Laterality Date   •  SECTION     •  SECTION N/A 2017    Procedure:  SECTION REPEAT;  Surgeon: Kelly Meléndez DO;  [facility];  Service:    • FOOT SURGERY      left   • WISDOM TOOTH EXTRACTION     ,   Family History   Problem Relation  Age of Onset   • Lung cancer Maternal Grandmother    • Lung cancer Maternal Uncle    ,   Social History     Tobacco Use   • Smoking status: Never   • Smokeless tobacco: Never   Vaping Use   • Vaping Use: Never used   Substance Use Topics   • Alcohol use: Yes     Comment: occ   • Drug use: No   ,   Medications Prior to Admission   Medication Sig Dispense Refill Last Dose   • loratadine (CLARITIN) 10 MG tablet Take 10 mg by mouth Daily.   Past Week   • LORazepam (Ativan) 1 MG tablet Take 1 tablet by mouth 2 (Two) Times a Day As Needed for Anxiety. 30 tablet 2 12/19/2022 at pm   • Multiple Vitamins-Minerals (MULTIVITAMIN ADULT PO) Take  by mouth.   Past Week   • traZODone (DESYREL) 50 MG tablet Take 50 mg by mouth At Night As Needed for Sleep.   12/19/2022 at pm    and Allergies:  Patient has no known allergies.    Current Facility-Administered Medications:   •  ceFAZolin in 0.9% normal saline (ANCEF) IVPB solution 2 g, 2 g, Intravenous, Once, Coco Garnica MD  •  lactated ringers infusion 1,000 mL, 1,000 mL, Intravenous, Continuous, Coco Garnica MD, Last Rate: 25 mL/hr at 12/20/22 0613, 1,000 mL at 12/20/22 0613  •  lactated ringers infusion, 100 mL/hr, Intravenous, Continuous, Debbie Espitia MD  •  lidocaine PF 1% (XYLOCAINE) injection 0.5 mL, 0.5 mL, Intradermal, Once PRN, Coco Garnica MD  •  lidocaine PF 1% (XYLOCAINE) injection 0.5 mL, 0.5 mL, Injection, Once PRN, Debbie Espitia MD  •  midazolam (VERSED) injection 2 mg, 2 mg, Intravenous, Q10 Min PRN, Debbie Espitia MD, 2 mg at 12/20/22 0643  •  scopolamine patch 1 mg/72 hr, 1 patch, Transdermal, Once, Debbie Espitia MD, 1 patch at 12/20/22 0638  •  sodium chloride 0.9 % flush 3 mL, 3 mL, Intravenous, PRN, Coco Garnica MD  •  sodium chloride 0.9 % flush 3 mL, 3 mL, Intravenous, Q12H, Debbie Espitia MD  •  sodium chloride 0.9 % flush 3-10 mL, 3-10 mL, Intravenous, PRN, Debbie Espitia  MD Zenaida  •  sodium chloride 0.9 % infusion 40 mL, 40 mL, Intravenous, PRN, Debbie Espitia MD    Objective     Vital Signs   Temp:  [99.2 °F (37.3 °C)] 99.2 °F (37.3 °C)  Heart Rate:  [75] 75  Resp:  [16] 16  BP: (107)/(74) 107/74    Physical Exam:  General appearance - alert, well appearing, and in no distress  Mental status - alert, oriented to person, place, and time  Eyes - pupils equal and reactive, extraocular eye movements intact  Neck - supple, no significant adenopathy  Chest - no tachypnea, retractions or cyanosis  Abdomen - soft, nontender, nondistended, no masses or organomegaly  Neurological - alert, oriented, normal speech, no focal findings or movement disorder noted  Musculoskeletal - no joint tenderness, deformity or swelling    Results Review:     Lab Results (last 24 hours)     Procedure Component Value Units Date/Time    Pregnancy, Urine - Urine, Clean Catch [[id number]]  (Normal) Collected: 12/20/22 0614    Specimen: Urine, Clean Catch Updated: 12/20/22 0627     HCG, Urine QL Negative        Imaging Results (Last 24 Hours)     ** No results found for the last 24 hours. **            Assessment & Plan       Luda Nguyen is a 36 y.o. female with biliary dyskinesia 2/2 low ejection fraction. History and imaging (I have reviewed her US, HIDA and labs above) above are consistent with this diagnosis. I did  the patient that there is a small chance that cholecystectomy does not completely resolve the pain, and that if this is the case, we will refer the patient to GI for an EGD. However, the symptoms including their nature, timing and duration are most consistent with biliary dsykinesia. I reviewed the gallbladder anatomy with the patient, and after a thorough discussion of risks (including bleeding, infection, bile leak, damage to surrounding structures including the common bile duct, and risks of anesthesia) and benefits, the patient wishes to proceed with laparoscopic  cholecystectomy, possible cholangiogram.  The patient is currently scheduled for laparoscopic cholecystectomy, possible cholangiogram ikp     I also discussed with the patient post operative pain management including multimodal pain control utilizing Tylenol, ibuprofen, and Roxicodone for breakthrough pain. I will plan to give the patient 10 tabs of 5mg Roxicodone post operatively for breakthrough pain      Coco Garnica MD  12/20/22  06:55 CST

## 2022-12-20 NOTE — ANESTHESIA PROCEDURE NOTES
Airway  Urgency: elective    Date/Time: 12/20/2022 7:06 AM  Airway not difficult    General Information and Staff    Patient location during procedure: OR  CRNA/CAA: Ginger Carter CRNA    Indications and Patient Condition  Indications for airway management: airway protection    Preoxygenated: yes  Mask difficulty assessment: 1 - vent by mask    Final Airway Details  Final airway type: endotracheal airway      Successful airway: ETT  Cuffed: yes   Successful intubation technique: direct laryngoscopy  Facilitating devices/methods: intubating stylet  Endotracheal tube insertion site: oral  Blade: Kelvin  Blade size: 3.5.  ETT size (mm): 6.5  Cormack-Lehane Classification: grade I - full view of glottis  Placement verified by: chest auscultation and capnometry   Cuff volume (mL): 5  Measured from: teeth  ETT/EBT  to teeth (cm): 20  Number of attempts at approach: 1  Assessment: lips, teeth, and gum same as pre-op and atraumatic intubation

## 2022-12-20 NOTE — DISCHARGE INSTRUCTIONS
Wound:   - you have skin glue on your incisions. Okay to shower tomorrow.   - Leave skin glue in place, it should slowly fall off over 2 weeks   - No swimming/soaking/bathing x 2 weeks to allow incisions to heal.     Activity:   - Activity as tolerated. NO heavy lifting x 4 weeks - no more than 35lb at a time.   - No driving or operating machinery on narcotic pain medication.     Pain medication:   - Take 1000mg of tylenol every 8 hours for 3 days. After three days, take it prn.   - Take 600mg of ibuprofen (motrin) every 8 hours for 3 days. After three days, take it prn.   - You have a prescription for a narcotic. It will be roxicodone 5mg tabs. Take these only as needed after you have taken the tylenol and ibuprofen. If you are taking the roxicodone, make sure to take a stool softener (colace) with it as it can cause constipation.   - The narcotic may make you nauseated, you will have a prescription for zofran in case of nausea.     Follow up:   - make an appointment to see me in 2 weeks  - If you have any concerns before then, call me office at [phone]

## 2022-12-20 NOTE — ANESTHESIA PREPROCEDURE EVALUATION
Anesthesia Evaluation     Patient summary reviewed   history of anesthetic complications: PONV  NPO Solid Status: > 8 hours  NPO Liquid Status: > 8 hours           Airway   Mallampati: I  TM distance: >3 FB  Neck ROM: full  No difficulty expected  Dental - normal exam     Pulmonary - negative pulmonary ROS   Cardiovascular - negative cardio ROS  Exercise tolerance: excellent (>7 METS)        Neuro/Psych- negative ROS  GI/Hepatic/Renal/Endo - negative ROS     Musculoskeletal     Abdominal    Substance History      OB/GYN          Other                        Anesthesia Plan    ASA 1     general     intravenous induction     Anesthetic plan, risks, benefits, and alternatives have been provided, discussed and informed consent has been obtained with: patient.        CODE STATUS:

## 2022-12-20 NOTE — OP NOTE
Laparoscopic Cholecystectomy Operative Report:     Patient: Luda Nguyen MRN: [id number]    YOB: 1985  Age: 37 y.o.  Sex: female   Unit: [facility] OR Room/Bed: PAD OR/MAIN OR Location: [facility]    Admitting Physician: COCO GARNICA    Primary Care Physician: Anthony Millan MD             INDICATIONS: This is a 37 y.o. year old female who presents with biliary dyskinesia 2/2 low ejection fraction. History and imaging (I have reviewed her US, HIDA and labs above) above are consistent with this diagnosis. I did  the patient that there is a small chance that cholecystectomy does not completely resolve the pain, and that if this is the case, we will refer the patient to GI for an EGD. However, the symptoms including their nature, timing and duration are most consistent with biliary dsykinesia. I reviewed the gallbladder anatomy with the patient, and after a thorough discussion of risks (including bleeding, infection, bile leak, damage to surrounding structures including the common bile duct, and risks of anesthesia) and benefits, the patient wishes to proceed with laparoscopic cholecystectomy, possible cholangiogram.  The patient is currently scheduled for laparoscopic cholecystectomy, possible cholangiogram todday.    DATE OF OPERATION: 12/20/2022     Surgeon(s) and Role:     * Coco Garnica MD - Primary    ANESTHESIA: General     PREOPERATIVE DIAGNOSIS: Biliary dyskinesia [K82.8]    POSTOPERATIVE DIAGNOSIS: Same    PROCEDURES PERFORMED:  Laparoscopic Cholecystectomy without cholangiogram     PROCEDURE DETAILS:        The patient was brought into the OR and placed in the supine position.  General anesthesia was induced.  The patient's abdomen was prepped and draped in the usual sterile fashion.  A time out was performed verifying the patient and the procedure.  A small incision was made just above the umbilicus and a Veress needle inserted.  The abdomen was inflated to 15 mmHg  with CO2 gas.  A 5 mm trocar was placed followed by the laparoscope.  A laparoscopic TAP block was performed with my deep local. An 11 mm trocar was placed just below the xiphoid.  Two more 5 mm trocars were placed along the patient's right subcostal margin.  The patient was placed in slight reverse Trendelenburg position.  The head of the gallbladder was grasped and retracted over the dome of the liver.  The infundibulum was also grasped and retracted to the patient's right side, opening up the triangle of Calot.  The cystic artery and cystic duct were clearly visualized. The critical view of safety was obtained and the confluence of the common bile duct to the common hepatic and cystic ducts was visualized.  Spy was turned on and the ductal anatomy was confirmed. The cystic duct was triple clipped proximally, single clipped distally, and divided. The cystic artery was double clipped proximally, single clipped distally and divided. The gallbladder was removed from the undersurface of the liver with electrocautery.  The gallbladder was placed in an Endo Catch bag and retrieved through the 11 mm trocar site.  The gallbladder fossa demonstrated no signs of bleeding or leakage of bile.  The abdomen was desufflated and trocars removed. The 11 mm trocar site fascia was closed with an 0 Vicryl on a UR6.  The skin was closed with 4-0 Monocryl followed by skin glue.  The patient tolerated the procedure well, was extubated in the OR and transferred to the PACU in stable condition.    Findings: gallbladder  Estimated Blood Loss: 15mL  Complications: none apparent            Specimens: Gallbladder and contents     Disposition: PACU - hemodynamically stable.           Condition: stable    Coco Garnica MD  12/20/22

## 2022-12-21 LAB
CYTO UR: NORMAL
LAB AP CASE REPORT: NORMAL
Lab: NORMAL
PATH REPORT.FINAL DX SPEC: NORMAL
PATH REPORT.GROSS SPEC: NORMAL

## 2023-01-05 ENCOUNTER — LAB (OUTPATIENT)
Dept: LAB | Facility: HOSPITAL | Age: 38
End: 2023-01-05
Payer: COMMERCIAL

## 2023-01-05 ENCOUNTER — OFFICE VISIT (OUTPATIENT)
Dept: SURGERY | Facility: CLINIC | Age: 38
End: 2023-01-05
Payer: COMMERCIAL

## 2023-01-05 VITALS
HEIGHT: 67 IN | HEART RATE: 88 BPM | SYSTOLIC BLOOD PRESSURE: 117 MMHG | WEIGHT: 138.01 LBS | DIASTOLIC BLOOD PRESSURE: 85 MMHG | OXYGEN SATURATION: 98 % | BODY MASS INDEX: 21.66 KG/M2

## 2023-01-05 DIAGNOSIS — R11.2 POST-OPERATIVE NAUSEA AND VOMITING: ICD-10-CM

## 2023-01-05 DIAGNOSIS — Z98.890 POST-OPERATIVE NAUSEA AND VOMITING: ICD-10-CM

## 2023-01-05 DIAGNOSIS — Z90.49 S/P LAPAROSCOPIC CHOLECYSTECTOMY: Primary | ICD-10-CM

## 2023-01-05 DIAGNOSIS — Z90.49 S/P LAPAROSCOPIC CHOLECYSTECTOMY: ICD-10-CM

## 2023-01-05 LAB
ALBUMIN SERPL-MCNC: 4.5 G/DL (ref 3.5–5.2)
ALBUMIN/GLOB SERPL: 1.7 G/DL
ALP SERPL-CCNC: 83 U/L (ref 39–117)
ALT SERPL W P-5'-P-CCNC: 12 U/L (ref 1–33)
ANION GAP SERPL CALCULATED.3IONS-SCNC: 10 MMOL/L (ref 5–15)
AST SERPL-CCNC: 20 U/L (ref 1–32)
BASOPHILS # BLD AUTO: 0.05 10*3/MM3 (ref 0–0.2)
BASOPHILS NFR BLD AUTO: 0.7 % (ref 0–1.5)
BILIRUB SERPL-MCNC: <0.2 MG/DL (ref 0–1.2)
BUN SERPL-MCNC: 17 MG/DL (ref 6–20)
BUN/CREAT SERPL: 24.6 (ref 7–25)
CALCIUM SPEC-SCNC: 9.3 MG/DL (ref 8.6–10.5)
CHLORIDE SERPL-SCNC: 102 MMOL/L (ref 98–107)
CO2 SERPL-SCNC: 27 MMOL/L (ref 22–29)
CREAT SERPL-MCNC: 0.69 MG/DL (ref 0.57–1)
DEPRECATED RDW RBC AUTO: 42.5 FL (ref 37–54)
EGFRCR SERPLBLD CKD-EPI 2021: 114.8 ML/MIN/1.73
EOSINOPHIL # BLD AUTO: 0.19 10*3/MM3 (ref 0–0.4)
EOSINOPHIL NFR BLD AUTO: 2.6 % (ref 0.3–6.2)
ERYTHROCYTE [DISTWIDTH] IN BLOOD BY AUTOMATED COUNT: 13.1 % (ref 12.3–15.4)
GLOBULIN UR ELPH-MCNC: 2.6 GM/DL
GLUCOSE SERPL-MCNC: 93 MG/DL (ref 65–99)
HCT VFR BLD AUTO: 37.8 % (ref 34–46.6)
HGB BLD-MCNC: 12 G/DL (ref 12–15.9)
IMM GRANULOCYTES # BLD AUTO: 0.02 10*3/MM3 (ref 0–0.05)
IMM GRANULOCYTES NFR BLD AUTO: 0.3 % (ref 0–0.5)
IRON 24H UR-MRATE: 33 MCG/DL (ref 37–145)
LYMPHOCYTES # BLD AUTO: 2.08 10*3/MM3 (ref 0.7–3.1)
LYMPHOCYTES NFR BLD AUTO: 27.9 % (ref 19.6–45.3)
MCH RBC QN AUTO: 28.2 PG (ref 26.6–33)
MCHC RBC AUTO-ENTMCNC: 31.7 G/DL (ref 31.5–35.7)
MCV RBC AUTO: 88.9 FL (ref 79–97)
MONOCYTES # BLD AUTO: 0.52 10*3/MM3 (ref 0.1–0.9)
MONOCYTES NFR BLD AUTO: 7 % (ref 5–12)
NEUTROPHILS NFR BLD AUTO: 4.59 10*3/MM3 (ref 1.7–7)
NEUTROPHILS NFR BLD AUTO: 61.5 % (ref 42.7–76)
NRBC BLD AUTO-RTO: 0 /100 WBC (ref 0–0.2)
PLATELET # BLD AUTO: 347 10*3/MM3 (ref 140–450)
PMV BLD AUTO: 10.3 FL (ref 6–12)
POTASSIUM SERPL-SCNC: 4.1 MMOL/L (ref 3.5–5.2)
PROT SERPL-MCNC: 7.1 G/DL (ref 6–8.5)
RBC # BLD AUTO: 4.25 10*6/MM3 (ref 3.77–5.28)
SODIUM SERPL-SCNC: 139 MMOL/L (ref 136–145)
WBC NRBC COR # BLD: 7.45 10*3/MM3 (ref 3.4–10.8)

## 2023-01-05 PROCEDURE — 85025 COMPLETE CBC W/AUTO DIFF WBC: CPT

## 2023-01-05 PROCEDURE — 36415 COLL VENOUS BLD VENIPUNCTURE: CPT

## 2023-01-05 PROCEDURE — 83540 ASSAY OF IRON: CPT

## 2023-01-05 PROCEDURE — 80053 COMPREHEN METABOLIC PANEL: CPT

## 2023-01-05 PROCEDURE — 99024 POSTOP FOLLOW-UP VISIT: CPT | Performed by: STUDENT IN AN ORGANIZED HEALTH CARE EDUCATION/TRAINING PROGRAM

## 2023-01-05 RX ORDER — PROMETHAZINE HYDROCHLORIDE 25 MG/1
25 TABLET ORAL EVERY 6 HOURS PRN
Qty: 20 TABLET | Refills: 0 | Status: SHIPPED | OUTPATIENT
Start: 2023-01-05

## 2023-01-05 NOTE — PROGRESS NOTES
Patient: Luda Nguyen    YOB: 1985    Date: 01/05/2023    Primary Care Provider: Anthony Millan MD    Vital Signs:   Vitals:    01/05/23 1445   BP: 117/85   BP Location: Left arm   Patient Position: Sitting   Cuff Size: Adult   Pulse: 88   SpO2: 98%   Weight: 62.6 kg (138 lb 0.1 oz)   Height: 170 cm (66.93\")       Overall doing well s/p lap jody. She take trazodone and falls asleep. She then wakes up at 12:30/1am and she cannot go back to sleep due to discomfort. She tried Tylenol PM and that did not work. She has pulsatile pain in her upper epigastrium where her extraction site is located. She had one episode of nausea followed by emesis and diarrhea yesterday after yogurt but nothing since then. She ate a cheeseburger last night and did well with that. No fevers. Bowels moving ok. Wounds healing well. No evidence of jaundice or scleral icterus.     Results Review:  Pathology and operative findings reviewed with patient.        Assessment / Plan:    Diagnoses and all orders for this visit:    1. S/P laparoscopic cholecystectomy (Primary)  -     CBC & Differential; Future  -     Comprehensive Metabolic Panel; Future  -     Iron; Future    2. Post-operative nausea and vomiting  -     CBC & Differential; Future  -     Comprehensive Metabolic Panel; Future  -     Iron; Future    Other orders  -     promethazine (PHENERGAN) 25 MG tablet; Take 1 tablet by mouth Every 6 (Six) Hours As Needed for Nausea or Vomiting.  Dispense: 20 tablet; Refill: 0      I have ordered labs for today and reviewed the results - WBC count normal, Hgb normal, LFTs normal, and iron low. I have recommended an iron supplementation. I have called in phenergan for her nausea and to help with sleep. She will follow up with me in 2 weeks.     Electronically signed by Coco Garnica MD  01/05/23  15:12 CST

## 2023-01-06 ENCOUNTER — PATIENT MESSAGE (OUTPATIENT)
Dept: SURGERY | Facility: CLINIC | Age: 38
End: 2023-01-06
Payer: COMMERCIAL

## 2023-01-19 ENCOUNTER — OFFICE VISIT (OUTPATIENT)
Dept: SURGERY | Facility: CLINIC | Age: 38
End: 2023-01-19
Payer: COMMERCIAL

## 2023-01-19 VITALS
OXYGEN SATURATION: 98 % | SYSTOLIC BLOOD PRESSURE: 114 MMHG | TEMPERATURE: 97.9 F | HEIGHT: 67 IN | BODY MASS INDEX: 21.66 KG/M2 | DIASTOLIC BLOOD PRESSURE: 78 MMHG | HEART RATE: 97 BPM | WEIGHT: 138.01 LBS

## 2023-01-19 DIAGNOSIS — Z90.49 S/P LAPAROSCOPIC CHOLECYSTECTOMY: Primary | ICD-10-CM

## 2023-01-19 PROCEDURE — 99024 POSTOP FOLLOW-UP VISIT: CPT | Performed by: STUDENT IN AN ORGANIZED HEALTH CARE EDUCATION/TRAINING PROGRAM

## 2023-01-19 NOTE — PROGRESS NOTES
"Patient: Luda Nguyen    YOB: 1985    Date: 01/19/2023    Primary Care Provider: Anthony Millan MD    Vital Signs:   Vitals:    01/19/23 1325   BP: 114/78   BP Location: Left arm   Patient Position: Sitting   Cuff Size: Adult   Pulse: 97   Temp: 97.9 °F (36.6 °C)   SpO2: 98%   Weight: 62.6 kg (138 lb 0.1 oz)   Height: 170 cm (66.93\")       Overall doing well. No fevers.Symptoms improved. Tolerating diet. Energy improving. Pain improving. Bowels moving ok.Sclera anicteric. Wounds healing well. No significant abdominal tenderness on exam. No evidence of jaundice or scleral icterus. Feeling much better on iron supplementation.     Results Review:   Pathology and operative findings reviewed with patient.        Assessment / Plan:    Diagnoses and all orders for this visit:    1. S/P laparoscopic cholecystectomy (Primary)      She has recovered well. No further issues. Follow up prn.     Electronically signed by Coco Garnica MD  01/19/23  13:28 CST                    "

## 2023-10-23 ENCOUNTER — OFFICE VISIT (OUTPATIENT)
Dept: OBSTETRICS AND GYNECOLOGY | Facility: CLINIC | Age: 38
End: 2023-10-23
Payer: COMMERCIAL

## 2023-10-23 VITALS
HEIGHT: 66 IN | SYSTOLIC BLOOD PRESSURE: 102 MMHG | BODY MASS INDEX: 21.05 KG/M2 | WEIGHT: 131 LBS | DIASTOLIC BLOOD PRESSURE: 68 MMHG

## 2023-10-23 DIAGNOSIS — R14.0 ABDOMINAL BLOATING: ICD-10-CM

## 2023-10-23 DIAGNOSIS — N92.0 MENORRHAGIA WITH REGULAR CYCLE: Primary | ICD-10-CM

## 2023-10-23 LAB
B-HCG UR QL: NEGATIVE
EXPIRATION DATE: NORMAL
INTERNAL NEGATIVE CONTROL: NEGATIVE
INTERNAL POSITIVE CONTROL: POSITIVE
Lab: NORMAL

## 2023-10-23 RX ORDER — MONTELUKAST SODIUM 10 MG/1
TABLET ORAL
COMMUNITY
Start: 2023-10-19

## 2023-10-23 RX ORDER — DICLOFENAC SODIUM 75 MG/1
TABLET, DELAYED RELEASE ORAL
COMMUNITY
Start: 2023-09-19

## 2023-10-23 RX ORDER — NORETHINDRONE ACETATE AND ETHINYL ESTRADIOL, ETHINYL ESTRADIOL AND FERROUS FUMARATE 1MG-10(24)
1 KIT ORAL DAILY
Qty: 28 TABLET | Refills: 12 | Status: SHIPPED | OUTPATIENT
Start: 2023-10-23

## 2023-10-23 NOTE — PROGRESS NOTES
"Subjective   Luda Nguyen is a 37 y.o. female.     Patient reports that within the last year, periods are regular but increasingly painful. For the first day or two, patient changing super plus tampon every hour and using sanitary pad as well. Patient reports suprapubic/abdominal bloating outside her period for months. Patient does regularly exercise, and during the worst pain, she has to wear compression clothing to complete activities. Patient has been taking a green supplement as well to decrease bloating and promote regular bowel movements. Patient feels that this helped some with bloating and regularity of stools. Patient does not report problems with bowel or bladder at this time. The patient reports a palpable spot in the middle of her suprapubic area that has been noticed for a year.    History of Present Illness  Patient presents today with complaints of painful periods, abdominal cramping, heavy menstrual flow that has been ongoing for a year, and a \"palpable bump\" in her suprapubic area.  Patient states she is also experiencing bloating that does not occur with her cycle.  Fibroids  This is a recurrent problem. The current episode started more than 1 month ago. The problem has been unchanged. Pertinent negatives include no abdominal pain, anorexia, arthralgias, change in bowel habit, chest pain, chills, congestion, coughing, diaphoresis, fatigue, fever, headaches, joint swelling, myalgias, nausea, neck pain, numbness, rash, sore throat, swollen glands, urinary symptoms, vertigo, visual change, vomiting or weakness. Nothing aggravates the symptoms. Treatments tried: Oral Green Supplement. The treatment provided no relief.        Review of Systems   Constitutional: Negative.  Negative for chills, diaphoresis, fatigue and fever.   HENT: Negative.  Negative for congestion, sore throat and swollen glands.    Eyes: Negative.    Respiratory: Negative.  Negative for cough.    Cardiovascular:  Negative for chest " pain.   Gastrointestinal:  Negative for abdominal pain, anorexia, change in bowel habit, nausea and vomiting.   Endocrine: Negative.    Genitourinary:  Positive for menstrual problem (Heavy bleeding).   Musculoskeletal: Negative.  Negative for arthralgias, joint swelling, myalgias and neck pain.   Skin: Negative.  Negative for rash.   Allergic/Immunologic: Negative.    Neurological: Negative.  Negative for vertigo, weakness and numbness.   Hematological: Negative.    Psychiatric/Behavioral: Negative.         Objective   Physical Exam  Vitals and nursing note reviewed. Exam conducted with a chaperone present.   Constitutional:       General: She is not in acute distress.     Appearance: She is well-developed. She is not diaphoretic.   HENT:      Head: Normocephalic.      Right Ear: External ear normal.      Left Ear: External ear normal.      Nose: Nose normal.   Eyes:      General: No scleral icterus.        Right eye: No discharge.         Left eye: No discharge.      Conjunctiva/sclera: Conjunctivae normal.      Pupils: Pupils are equal, round, and reactive to light.   Neck:      Thyroid: No thyromegaly.      Vascular: No carotid bruit.      Trachea: No tracheal deviation.   Cardiovascular:      Rate and Rhythm: Normal rate and regular rhythm.      Heart sounds: Normal heart sounds. No murmur heard.  Pulmonary:      Effort: Pulmonary effort is normal. No respiratory distress.      Breath sounds: Normal breath sounds. No wheezing.   Chest:   Breasts:     Breasts are symmetrical.      Right: Normal. No swelling, bleeding, inverted nipple, mass, nipple discharge, skin change or tenderness.      Left: Normal. No swelling, bleeding, inverted nipple, mass, nipple discharge, skin change or tenderness.   Abdominal:      General: There is no distension.      Palpations: Abdomen is soft. There is no mass.      Tenderness: There is no abdominal tenderness. There is no right CVA tenderness, left CVA tenderness or guarding.     "  Hernia: No hernia is present. There is no hernia in the left inguinal area or right inguinal area.       Genitourinary:     Labia:         Left: No rash.    Musculoskeletal:         General: No tenderness. Normal range of motion.      Cervical back: Normal range of motion and neck supple.   Lymphadenopathy:      Head:      Right side of head: No submental, submandibular, tonsillar, preauricular, posterior auricular or occipital adenopathy.      Left side of head: No submental, submandibular, tonsillar, preauricular, posterior auricular or occipital adenopathy.      Cervical: No cervical adenopathy.      Right cervical: No superficial, deep or posterior cervical adenopathy.     Left cervical: No superficial, deep or posterior cervical adenopathy.      Upper Body:      Right upper body: No supraclavicular, axillary or pectoral adenopathy.      Left upper body: No supraclavicular, axillary or pectoral adenopathy.      Lower Body: No right inguinal adenopathy. No left inguinal adenopathy.   Skin:     General: Skin is warm and dry.      Capillary Refill: Capillary refill takes 2 to 3 seconds.      Findings: No bruising, erythema or rash.   Neurological:      Mental Status: She is alert and oriented to person, place, and time.      Coordination: Coordination normal.   Psychiatric:         Mood and Affect: Mood normal.         Behavior: Behavior normal.         Thought Content: Thought content normal.         Judgment: Judgment normal.         Assessment & Plan   Diagnoses and all orders for this visit:    1. Menorrhagia with regular cycle (Primary)  Patient presents today with menorrhagia that has been ongoing for 1 year. Patient reports that the first day or two of her cycle bleeding to be extremely heavy requiring use of a sanitary pad and tampon change every 1 hour. Additionally, she reports a palpable \"spot\" in the middle of her suprapubic area that has been noticed for 1 year.   -     CT Abdomen Pelvis Without " "Contrast: F/U on \"4 cm solid appearing area just superior to uterus\" per TVUS completed in-office today.  -     Norethin-Eth Estrad-Fe Biphas (Lo Loestrin Fe) 1 MG-10 MCG / 10 MCG tablet; Take 1 tablet by mouth Daily.  Dispense: 28 tablet; Refill: 12  -     POC Pregnancy, Urine    2. Abdominal bloating  -     CT Abdomen Pelvis Without Contrast:  F/U on \"4 cm solid appearing area just superior to uterus\" per TVUS completed in-office today.  -     POC Pregnancy, Urine: negative in-office       BMI is within normal parameters. No other follow-up for BMI required.       Yasmin Landry, APRN   "

## 2023-10-23 NOTE — PROGRESS NOTES
Subjective   Luda Nguyen is a 37 y.o. female.     History of Present Illness     Within the last year, periods are regular but increasingly painful. For the first day or two, patient changing super plus tampon every hour and using pad. Patient reports suprapubic bloating outside her period for a whole month. Patient does regularly exercise, and during the worst pain, she has to wear compression clothing to complete activity. Patient has been taking a green supplement as well. Patient feels that this helped some with bloating and regularity of stools. Patient does not report problems with bowel or bladder at this time.       Reports spot noticed for a year.    Review of Systems    Objective   Physical Exam      Assessment & Plan   There are no diagnoses linked to this encounter.   BMI is within normal parameters. No other follow-up for BMI required.       Yasmin Landry, APRN

## 2024-03-22 ENCOUNTER — TRANSCRIBE ORDERS (OUTPATIENT)
Dept: ADMINISTRATIVE | Facility: HOSPITAL | Age: 39
End: 2024-03-22
Payer: COMMERCIAL

## 2024-03-22 DIAGNOSIS — R10.9 ABDOMINAL PAIN, UNSPECIFIED ABDOMINAL LOCATION: Primary | ICD-10-CM

## 2024-04-01 ENCOUNTER — HOSPITAL ENCOUNTER (OUTPATIENT)
Dept: CT IMAGING | Facility: HOSPITAL | Age: 39
Discharge: HOME | End: 2024-04-01
Admitting: STUDENT IN AN ORGANIZED HEALTH CARE EDUCATION/TRAINING PROGRAM
Payer: COMMERCIAL

## 2024-04-01 DIAGNOSIS — R10.9 ABDOMINAL PAIN, UNSPECIFIED ABDOMINAL LOCATION: ICD-10-CM

## 2024-04-01 LAB — CREAT BLDA-MCNC: 0.7 MG/DL (ref 0.6–1.3)

## 2024-04-01 PROCEDURE — 82565 ASSAY OF CREATININE: CPT

## 2024-04-01 PROCEDURE — 25510000001 IOPAMIDOL 61 % SOLUTION: Performed by: STUDENT IN AN ORGANIZED HEALTH CARE EDUCATION/TRAINING PROGRAM

## 2024-04-01 PROCEDURE — 74177 CT ABD & PELVIS W/CONTRAST: CPT

## 2024-04-01 RX ADMIN — IOPAMIDOL 100 ML: 612 INJECTION, SOLUTION INTRAVENOUS at 07:55

## 2024-04-17 ENCOUNTER — OFFICE VISIT (OUTPATIENT)
Age: 39
End: 2024-04-17
Payer: COMMERCIAL

## 2024-04-17 VITALS
WEIGHT: 134 LBS | DIASTOLIC BLOOD PRESSURE: 64 MMHG | SYSTOLIC BLOOD PRESSURE: 102 MMHG | HEIGHT: 66 IN | BODY MASS INDEX: 21.53 KG/M2

## 2024-04-17 DIAGNOSIS — N80.9 ENDOMETRIOSIS: ICD-10-CM

## 2024-04-17 DIAGNOSIS — N92.0 MENORRHAGIA WITH REGULAR CYCLE: Primary | ICD-10-CM

## 2024-04-17 DIAGNOSIS — R10.2 PELVIC PAIN: ICD-10-CM

## 2024-04-17 DIAGNOSIS — N94.10 DYSPAREUNIA IN FEMALE: ICD-10-CM

## 2024-04-17 DIAGNOSIS — Z78.9 NON-SMOKER: ICD-10-CM

## 2024-04-17 DIAGNOSIS — N94.6 DYSMENORRHEA: ICD-10-CM

## 2024-04-17 DIAGNOSIS — D25.9 UTERINE LEIOMYOMA, UNSPECIFIED LOCATION: ICD-10-CM

## 2024-04-17 RX ORDER — RELUGOLIX, ESTRADIOL HEMIHYDRATE, AND NORETHINDRONE ACETATE 40; 1; .5 MG/1; MG/1; MG/1
1 TABLET, FILM COATED ORAL DAILY
Qty: 30 TABLET | Refills: 3 | Status: SHIPPED | OUTPATIENT
Start: 2024-04-17

## 2024-04-17 RX ORDER — ALBUTEROL SULFATE AND BUDESONIDE 90; 80 UG/1; UG/1
AEROSOL, METERED RESPIRATORY (INHALATION)
COMMUNITY

## 2024-04-17 NOTE — PROGRESS NOTES
"Chief Complaint  Menorrhagia (Pt initially saw Yasmin Landry 10/23/2023 for menorrhagia and was started on ocp and was supposed to come back in January to see how she was doing and cancelled that appt, she states she took them for two months and reports irregular bleeding with pain and d/c, she had u/s done 10/2023 as well as CT of abdomen and pelvis done by Anthony Millan 4/1 due to abdominal pain)    Subjective        Luda Nguyen presents to [facility]  History of Present Illness    Patient presents today for follow-up  Previous office note, images are reviewed  She has a long history of abnormal menses  They are regularly times but heavy in terms of flow and the most significant part is her cramping associated with the flow  In addition, she does have pain between her cycles as well as pain with intercourse at times.  This seems to be positional and with penetration.  She is able to orgasm  She has tried oral contraceptives without success  Her partner is status post vasectomy  She does not desire future fertility  She has no risk factors for osteoporosis        Objective   Vital Signs:  /64 (BP Location: Left arm, Patient Position: Sitting, Cuff Size: Adult)   Ht 167.6 cm (66\")   Wt 60.8 kg (134 lb)   BMI 21.63 kg/m²   Estimated body mass index is 21.63 kg/m² as calculated from the following:    Height as of this encounter: 167.6 cm (66\").    Weight as of this encounter: 60.8 kg (134 lb).       BMI is within normal parameters. No other follow-up for BMI required.      Physical Exam  Exam conducted with a chaperone present.   Abdominal:      Hernia: There is no hernia in the left inguinal area or right inguinal area.   Genitourinary:     General: Normal vulva.      Exam position: Lithotomy position.      Pubic Area: No rash or pubic lice.       Labia:         Right: No rash, tenderness, lesion or injury.         Left: No rash, tenderness, lesion or injury.       Urethra: No " prolapse, urethral pain, urethral swelling or urethral lesion.      Vagina: Normal.      Cervix: Normal.      Uterus: Enlarged and tender. Not deviated, not fixed and no uterine prolapse.       Comments: Uterus is enlarged on exam, consistent with 14 weeks size.  It is mobile.  It is irregular consistent with uterine fibroids versus adenomyosis.  Lymphadenopathy:      Lower Body: No right inguinal adenopathy. No left inguinal adenopathy.        Result Review :                     Assessment and Plan     Diagnoses and all orders for this visit:    1. Menorrhagia with regular cycle (Primary)  -     Relugolix-Estradiol-Norethind (Myfembree) 40-1-0.5 MG tablet; Take 1 tablet by mouth Daily.  Dispense: 30 tablet; Refill: 3    2. Pelvic pain  -     Relugolix-Estradiol-Norethind (Myfembree) 40-1-0.5 MG tablet; Take 1 tablet by mouth Daily.  Dispense: 30 tablet; Refill: 3    3. Dyspareunia in female    4. Dysmenorrhea  -     Relugolix-Estradiol-Norethind (Myfembree) 40-1-0.5 MG tablet; Take 1 tablet by mouth Daily.  Dispense: 30 tablet; Refill: 3    5. Uterine leiomyoma, unspecified location  -     Relugolix-Estradiol-Norethind (Myfembree) 40-1-0.5 MG tablet; Take 1 tablet by mouth Daily.  Dispense: 30 tablet; Refill: 3    6. Endometriosis  -     Relugolix-Estradiol-Norethind (Myfembree) 40-1-0.5 MG tablet; Take 1 tablet by mouth Daily.  Dispense: 30 tablet; Refill: 3    7. Non-smoker             Follow Up     Return in about 3 months (around 7/17/2024) for With Gyn U/S, with me.  Patient was given instructions and counseling regarding her condition or for health maintenance advice. Please see specific information pulled into the AVS if appropriate.     All options were discussed including surgical options.  She desires a trial of Myfembree    August Márquez MD

## 2024-07-06 ENCOUNTER — HOSPITAL ENCOUNTER (OUTPATIENT)
Facility: HOSPITAL | Age: 39
Discharge: HOME | End: 2024-07-08
Attending: STUDENT IN AN ORGANIZED HEALTH CARE EDUCATION/TRAINING PROGRAM | Admitting: STUDENT IN AN ORGANIZED HEALTH CARE EDUCATION/TRAINING PROGRAM
Payer: COMMERCIAL

## 2024-07-06 ENCOUNTER — APPOINTMENT (OUTPATIENT)
Dept: ULTRASOUND IMAGING | Facility: HOSPITAL | Age: 39
End: 2024-07-06
Payer: COMMERCIAL

## 2024-07-06 ENCOUNTER — ANESTHESIA EVENT (OUTPATIENT)
Dept: PERIOP | Facility: HOSPITAL | Age: 39
End: 2024-07-06
Payer: COMMERCIAL

## 2024-07-06 ENCOUNTER — ANESTHESIA (OUTPATIENT)
Dept: PERIOP | Facility: HOSPITAL | Age: 39
End: 2024-07-06
Payer: COMMERCIAL

## 2024-07-06 ENCOUNTER — APPOINTMENT (OUTPATIENT)
Dept: CT IMAGING | Facility: HOSPITAL | Age: 39
End: 2024-07-06
Payer: COMMERCIAL

## 2024-07-06 ENCOUNTER — APPOINTMENT (OUTPATIENT)
Dept: GENERAL RADIOLOGY | Facility: HOSPITAL | Age: 39
End: 2024-07-06
Payer: COMMERCIAL

## 2024-07-06 DIAGNOSIS — N20.1 URETERAL STONE: Primary | ICD-10-CM

## 2024-07-06 DIAGNOSIS — N39.0 URINARY TRACT INFECTION WITHOUT HEMATURIA, SITE UNSPECIFIED: ICD-10-CM

## 2024-07-06 LAB
ALBUMIN SERPL-MCNC: 4.3 G/DL (ref 3.5–5.2)
ALBUMIN/GLOB SERPL: 1.8 G/DL
ALP SERPL-CCNC: 58 U/L (ref 39–117)
ALT SERPL W P-5'-P-CCNC: 12 U/L (ref 1–33)
ANION GAP SERPL CALCULATED.3IONS-SCNC: 12 MMOL/L (ref 5–15)
AST SERPL-CCNC: 23 U/L (ref 1–32)
B-HCG UR QL: NEGATIVE
BACTERIA UR QL AUTO: ABNORMAL /HPF
BASOPHILS # BLD AUTO: 0.02 10*3/MM3 (ref 0–0.2)
BASOPHILS NFR BLD AUTO: 0.1 % (ref 0–1.5)
BILIRUB SERPL-MCNC: 0.8 MG/DL (ref 0–1.2)
BILIRUB UR QL STRIP: NEGATIVE
BUN SERPL-MCNC: 18 MG/DL (ref 6–20)
BUN/CREAT SERPL: 18.2 (ref 7–25)
CALCIUM SPEC-SCNC: 9 MG/DL (ref 8.6–10.5)
CHLORIDE SERPL-SCNC: 102 MMOL/L (ref 98–107)
CLARITY UR: CLEAR
CO2 SERPL-SCNC: 25 MMOL/L (ref 22–29)
COLOR UR: YELLOW
CREAT SERPL-MCNC: 0.99 MG/DL (ref 0.57–1)
D-LACTATE SERPL-SCNC: 2.6 MMOL/L (ref 0.5–2)
D-LACTATE SERPL-SCNC: 3.5 MMOL/L (ref 0.5–2)
D-LACTATE SERPL-SCNC: 3.6 MMOL/L (ref 0.5–2)
DEPRECATED RDW RBC AUTO: 42.4 FL (ref 37–54)
EGFRCR SERPLBLD CKD-EPI 2021: 75 ML/MIN/1.73
EOSINOPHIL # BLD AUTO: 0 10*3/MM3 (ref 0–0.4)
EOSINOPHIL NFR BLD AUTO: 0 % (ref 0.3–6.2)
ERYTHROCYTE [DISTWIDTH] IN BLOOD BY AUTOMATED COUNT: 13.1 % (ref 12.3–15.4)
GLOBULIN UR ELPH-MCNC: 2.4 GM/DL
GLUCOSE SERPL-MCNC: 105 MG/DL (ref 65–99)
GLUCOSE UR STRIP-MCNC: NEGATIVE MG/DL
HCG SERPL QL: NEGATIVE
HCT VFR BLD AUTO: 36.6 % (ref 34–46.6)
HGB BLD-MCNC: 12.4 G/DL (ref 12–15.9)
HGB UR QL STRIP.AUTO: ABNORMAL
HYALINE CASTS UR QL AUTO: ABNORMAL /LPF
IMM GRANULOCYTES # BLD AUTO: 0.03 10*3/MM3 (ref 0–0.05)
IMM GRANULOCYTES NFR BLD AUTO: 0.2 % (ref 0–0.5)
KETONES UR QL STRIP: NEGATIVE
LEUKOCYTE ESTERASE UR QL STRIP.AUTO: ABNORMAL
LIPASE SERPL-CCNC: 34 U/L (ref 13–60)
LYMPHOCYTES # BLD AUTO: 0.33 10*3/MM3 (ref 0.7–3.1)
LYMPHOCYTES NFR BLD AUTO: 2.3 % (ref 19.6–45.3)
MAGNESIUM SERPL-MCNC: 1.8 MG/DL (ref 1.6–2.6)
MCH RBC QN AUTO: 29.9 PG (ref 26.6–33)
MCHC RBC AUTO-ENTMCNC: 33.9 G/DL (ref 31.5–35.7)
MCV RBC AUTO: 88.2 FL (ref 79–97)
MONOCYTES # BLD AUTO: 0.23 10*3/MM3 (ref 0.1–0.9)
MONOCYTES NFR BLD AUTO: 1.6 % (ref 5–12)
NEUTROPHILS NFR BLD AUTO: 13.69 10*3/MM3 (ref 1.7–7)
NEUTROPHILS NFR BLD AUTO: 95.8 % (ref 42.7–76)
NITRITE UR QL STRIP: POSITIVE
NRBC BLD AUTO-RTO: 0 /100 WBC (ref 0–0.2)
PH UR STRIP.AUTO: 8 [PH] (ref 5–8)
PLATELET # BLD AUTO: 212 10*3/MM3 (ref 140–450)
PMV BLD AUTO: 9.6 FL (ref 6–12)
POTASSIUM SERPL-SCNC: 3.9 MMOL/L (ref 3.5–5.2)
PROT SERPL-MCNC: 6.7 G/DL (ref 6–8.5)
PROT UR QL STRIP: ABNORMAL
RBC # BLD AUTO: 4.15 10*6/MM3 (ref 3.77–5.28)
RBC # UR STRIP: ABNORMAL /HPF
REF LAB TEST METHOD: ABNORMAL
SODIUM SERPL-SCNC: 139 MMOL/L (ref 136–145)
SP GR UR STRIP: 1.02 (ref 1–1.03)
SQUAMOUS #/AREA URNS HPF: ABNORMAL /HPF
UROBILINOGEN UR QL STRIP: ABNORMAL
WBC # UR STRIP: ABNORMAL /HPF
WBC NRBC COR # BLD AUTO: 14.3 10*3/MM3 (ref 3.4–10.8)

## 2024-07-06 PROCEDURE — 25010000002 CEFTRIAXONE PER 250 MG

## 2024-07-06 PROCEDURE — 25510000001 IOPAMIDOL 61 % SOLUTION: Performed by: UROLOGY

## 2024-07-06 PROCEDURE — 87086 URINE CULTURE/COLONY COUNT: CPT

## 2024-07-06 PROCEDURE — 25510000001 IOPAMIDOL 61 % SOLUTION

## 2024-07-06 PROCEDURE — 83605 ASSAY OF LACTIC ACID: CPT

## 2024-07-06 PROCEDURE — C2617 STENT, NON-COR, TEM W/O DEL: HCPCS | Performed by: UROLOGY

## 2024-07-06 PROCEDURE — 87186 SC STD MICRODIL/AGAR DIL: CPT

## 2024-07-06 PROCEDURE — 25810000003 LACTATED RINGERS SOLUTION: Performed by: FAMILY MEDICINE

## 2024-07-06 PROCEDURE — 25010000002 FENTANYL CITRATE (PF) 50 MCG/ML SOLUTION: Performed by: NURSE ANESTHETIST, CERTIFIED REGISTERED

## 2024-07-06 PROCEDURE — 25810000003 LACTATED RINGERS PER 1000 ML: Performed by: FAMILY MEDICINE

## 2024-07-06 PROCEDURE — 96376 TX/PRO/DX INJ SAME DRUG ADON: CPT

## 2024-07-06 PROCEDURE — C1726 CATH, BAL DIL, NON-VASCULAR: HCPCS | Performed by: UROLOGY

## 2024-07-06 PROCEDURE — 25010000002 CEFTRIAXONE PER 250 MG: Performed by: NURSE ANESTHETIST, CERTIFIED REGISTERED

## 2024-07-06 PROCEDURE — 83735 ASSAY OF MAGNESIUM: CPT

## 2024-07-06 PROCEDURE — 25810000003 SODIUM CHLORIDE 0.9 % SOLUTION: Performed by: NURSE ANESTHETIST, CERTIFIED REGISTERED

## 2024-07-06 PROCEDURE — 25010000002 KETOROLAC TROMETHAMINE PER 15 MG

## 2024-07-06 PROCEDURE — G0378 HOSPITAL OBSERVATION PER HR: HCPCS

## 2024-07-06 PROCEDURE — 85025 COMPLETE CBC W/AUTO DIFF WBC: CPT

## 2024-07-06 PROCEDURE — 74420 UROGRAPHY RTRGR +-KUB: CPT | Performed by: UROLOGY

## 2024-07-06 PROCEDURE — 36415 COLL VENOUS BLD VENIPUNCTURE: CPT

## 2024-07-06 PROCEDURE — 76856 US EXAM PELVIC COMPLETE: CPT

## 2024-07-06 PROCEDURE — 87088 URINE BACTERIA CULTURE: CPT

## 2024-07-06 PROCEDURE — 0 HYDROMORPHONE 1 MG/ML SOLUTION: Performed by: UROLOGY

## 2024-07-06 PROCEDURE — 25010000002 ONDANSETRON PER 1 MG: Performed by: NURSE ANESTHETIST, CERTIFIED REGISTERED

## 2024-07-06 PROCEDURE — C1758 CATHETER, URETERAL: HCPCS | Performed by: UROLOGY

## 2024-07-06 PROCEDURE — 25010000002 KETOROLAC TROMETHAMINE PER 15 MG: Performed by: UROLOGY

## 2024-07-06 PROCEDURE — 52332 CYSTOSCOPY AND TREATMENT: CPT | Performed by: UROLOGY

## 2024-07-06 PROCEDURE — 93975 VASCULAR STUDY: CPT

## 2024-07-06 PROCEDURE — 94799 UNLISTED PULMONARY SVC/PX: CPT

## 2024-07-06 PROCEDURE — 99285 EMERGENCY DEPT VISIT HI MDM: CPT

## 2024-07-06 PROCEDURE — 76830 TRANSVAGINAL US NON-OB: CPT

## 2024-07-06 PROCEDURE — 74420 UROGRAPHY RTRGR +-KUB: CPT

## 2024-07-06 PROCEDURE — 80053 COMPREHEN METABOLIC PANEL: CPT

## 2024-07-06 PROCEDURE — 99222 1ST HOSP IP/OBS MODERATE 55: CPT | Performed by: UROLOGY

## 2024-07-06 PROCEDURE — 84703 CHORIONIC GONADOTROPIN ASSAY: CPT

## 2024-07-06 PROCEDURE — 25010000002 MORPHINE PER 10 MG: Performed by: EMERGENCY MEDICINE

## 2024-07-06 PROCEDURE — 81025 URINE PREGNANCY TEST: CPT | Performed by: STUDENT IN AN ORGANIZED HEALTH CARE EDUCATION/TRAINING PROGRAM

## 2024-07-06 PROCEDURE — 74177 CT ABD & PELVIS W/CONTRAST: CPT

## 2024-07-06 PROCEDURE — 25810000003 LACTATED RINGERS PER 1000 ML: Performed by: NURSE ANESTHETIST, CERTIFIED REGISTERED

## 2024-07-06 PROCEDURE — 52352 CYSTOURETERO W/STONE REMOVE: CPT | Performed by: UROLOGY

## 2024-07-06 PROCEDURE — 82360 CALCULUS ASSAY QUANT: CPT | Performed by: UROLOGY

## 2024-07-06 PROCEDURE — 25010000002 ONDANSETRON PER 1 MG

## 2024-07-06 PROCEDURE — 96375 TX/PRO/DX INJ NEW DRUG ADDON: CPT

## 2024-07-06 PROCEDURE — 25010000002 DROPERIDOL PER 5 MG: Performed by: NURSE ANESTHETIST, CERTIFIED REGISTERED

## 2024-07-06 PROCEDURE — 25010000002 PROPOFOL 10 MG/ML EMULSION: Performed by: NURSE ANESTHETIST, CERTIFIED REGISTERED

## 2024-07-06 PROCEDURE — 88300 SURGICAL PATH GROSS: CPT | Performed by: UROLOGY

## 2024-07-06 PROCEDURE — 83690 ASSAY OF LIPASE: CPT

## 2024-07-06 PROCEDURE — 25810000003 SODIUM CHLORIDE 0.9 % SOLUTION

## 2024-07-06 PROCEDURE — 81001 URINALYSIS AUTO W/SCOPE: CPT

## 2024-07-06 PROCEDURE — 96365 THER/PROPH/DIAG IV INF INIT: CPT

## 2024-07-06 PROCEDURE — C1769 GUIDE WIRE: HCPCS | Performed by: UROLOGY

## 2024-07-06 DEVICE — URETERAL STENT WITH SIDE HOLES 6FX22CM
Type: IMPLANTABLE DEVICE | Site: URETER | Status: FUNCTIONAL
Brand: TRIA™ SOFT

## 2024-07-06 RX ORDER — ONDANSETRON 2 MG/ML
4 INJECTION INTRAMUSCULAR; INTRAVENOUS
Status: DISCONTINUED | OUTPATIENT
Start: 2024-07-06 | End: 2024-07-06 | Stop reason: HOSPADM

## 2024-07-06 RX ORDER — FLUMAZENIL 0.1 MG/ML
0.2 INJECTION INTRAVENOUS AS NEEDED
Status: DISCONTINUED | OUTPATIENT
Start: 2024-07-06 | End: 2024-07-06 | Stop reason: HOSPADM

## 2024-07-06 RX ORDER — SODIUM CHLORIDE, SODIUM LACTATE, POTASSIUM CHLORIDE, CALCIUM CHLORIDE 600; 310; 30; 20 MG/100ML; MG/100ML; MG/100ML; MG/100ML
150 INJECTION, SOLUTION INTRAVENOUS CONTINUOUS
Status: DISCONTINUED | OUTPATIENT
Start: 2024-07-06 | End: 2024-07-06 | Stop reason: HOSPADM

## 2024-07-06 RX ORDER — OXYBUTYNIN CHLORIDE 5 MG/1
5 TABLET, EXTENDED RELEASE ORAL DAILY
Status: DISCONTINUED | OUTPATIENT
Start: 2024-07-07 | End: 2024-07-08 | Stop reason: HOSPADM

## 2024-07-06 RX ORDER — PROMETHAZINE HYDROCHLORIDE 25 MG/1
12.5 TABLET ORAL EVERY 6 HOURS PRN
Status: DISCONTINUED | OUTPATIENT
Start: 2024-07-06 | End: 2024-07-08 | Stop reason: HOSPADM

## 2024-07-06 RX ORDER — DEXMEDETOMIDINE HYDROCHLORIDE 4 UG/ML
INJECTION, SOLUTION INTRAVENOUS AS NEEDED
Status: DISCONTINUED | OUTPATIENT
Start: 2024-07-06 | End: 2024-07-06 | Stop reason: SURG

## 2024-07-06 RX ORDER — SODIUM CHLORIDE, SODIUM LACTATE, POTASSIUM CHLORIDE, CALCIUM CHLORIDE 600; 310; 30; 20 MG/100ML; MG/100ML; MG/100ML; MG/100ML
INJECTION, SOLUTION INTRAVENOUS CONTINUOUS PRN
Status: DISCONTINUED | OUTPATIENT
Start: 2024-07-06 | End: 2024-07-06 | Stop reason: SURG

## 2024-07-06 RX ORDER — LIDOCAINE HYDROCHLORIDE 20 MG/ML
INJECTION, SOLUTION EPIDURAL; INFILTRATION; INTRACAUDAL; PERINEURAL AS NEEDED
Status: DISCONTINUED | OUTPATIENT
Start: 2024-07-06 | End: 2024-07-06 | Stop reason: SURG

## 2024-07-06 RX ORDER — KETOROLAC TROMETHAMINE 30 MG/ML
15 INJECTION, SOLUTION INTRAMUSCULAR; INTRAVENOUS EVERY 6 HOURS
Qty: 20 ML | Refills: 0 | Status: DISCONTINUED | OUTPATIENT
Start: 2024-07-06 | End: 2024-07-08 | Stop reason: HOSPADM

## 2024-07-06 RX ORDER — PROPOFOL 10 MG/ML
VIAL (ML) INTRAVENOUS AS NEEDED
Status: DISCONTINUED | OUTPATIENT
Start: 2024-07-06 | End: 2024-07-06 | Stop reason: SURG

## 2024-07-06 RX ORDER — HYDROCODONE BITARTRATE AND ACETAMINOPHEN 5; 325 MG/1; MG/1
1 TABLET ORAL EVERY 4 HOURS PRN
Status: DISCONTINUED | OUTPATIENT
Start: 2024-07-06 | End: 2024-07-08 | Stop reason: HOSPADM

## 2024-07-06 RX ORDER — LORAZEPAM 1 MG/1
1 TABLET ORAL DAILY PRN
COMMUNITY

## 2024-07-06 RX ORDER — ACETAMINOPHEN 500 MG
1000 TABLET ORAL ONCE
Status: DISCONTINUED | OUTPATIENT
Start: 2024-07-06 | End: 2024-07-06 | Stop reason: HOSPADM

## 2024-07-06 RX ORDER — SODIUM CHLORIDE 0.9 % (FLUSH) 0.9 %
10 SYRINGE (ML) INJECTION AS NEEDED
Status: DISCONTINUED | OUTPATIENT
Start: 2024-07-06 | End: 2024-07-06

## 2024-07-06 RX ORDER — HYDROCODONE BITARTRATE AND ACETAMINOPHEN 10; 325 MG/1; MG/1
1 TABLET ORAL EVERY 4 HOURS PRN
Status: DISCONTINUED | OUTPATIENT
Start: 2024-07-06 | End: 2024-07-08 | Stop reason: HOSPADM

## 2024-07-06 RX ORDER — TAMSULOSIN HYDROCHLORIDE 0.4 MG/1
0.4 CAPSULE ORAL DAILY
Status: DISCONTINUED | OUTPATIENT
Start: 2024-07-07 | End: 2024-07-08 | Stop reason: HOSPADM

## 2024-07-06 RX ORDER — SODIUM CHLORIDE 9 MG/ML
40 INJECTION, SOLUTION INTRAVENOUS AS NEEDED
Status: DISCONTINUED | OUTPATIENT
Start: 2024-07-06 | End: 2024-07-06 | Stop reason: HOSPADM

## 2024-07-06 RX ORDER — NALOXONE HCL 0.4 MG/ML
0.1 VIAL (ML) INJECTION
Status: DISCONTINUED | OUTPATIENT
Start: 2024-07-06 | End: 2024-07-08 | Stop reason: HOSPADM

## 2024-07-06 RX ORDER — SODIUM CHLORIDE 9 MG/ML
INJECTION, SOLUTION INTRAVENOUS CONTINUOUS PRN
Status: DISCONTINUED | OUTPATIENT
Start: 2024-07-06 | End: 2024-07-06 | Stop reason: SURG

## 2024-07-06 RX ORDER — ONDANSETRON 2 MG/ML
4 INJECTION INTRAMUSCULAR; INTRAVENOUS ONCE
Status: COMPLETED | OUTPATIENT
Start: 2024-07-06 | End: 2024-07-06

## 2024-07-06 RX ORDER — DOCUSATE SODIUM 100 MG/1
100 CAPSULE, LIQUID FILLED ORAL 2 TIMES DAILY PRN
Status: DISCONTINUED | OUTPATIENT
Start: 2024-07-06 | End: 2024-07-08 | Stop reason: HOSPADM

## 2024-07-06 RX ORDER — ONDANSETRON 2 MG/ML
INJECTION INTRAMUSCULAR; INTRAVENOUS AS NEEDED
Status: DISCONTINUED | OUTPATIENT
Start: 2024-07-06 | End: 2024-07-06 | Stop reason: SURG

## 2024-07-06 RX ORDER — NALOXONE HCL 0.4 MG/ML
0.04 VIAL (ML) INJECTION AS NEEDED
Status: DISCONTINUED | OUTPATIENT
Start: 2024-07-06 | End: 2024-07-06 | Stop reason: HOSPADM

## 2024-07-06 RX ORDER — CEFTRIAXONE 1 G/1
INJECTION, POWDER, FOR SOLUTION INTRAMUSCULAR; INTRAVENOUS AS NEEDED
Status: DISCONTINUED | OUTPATIENT
Start: 2024-07-06 | End: 2024-07-06 | Stop reason: SURG

## 2024-07-06 RX ORDER — DROPERIDOL 2.5 MG/ML
INJECTION, SOLUTION INTRAMUSCULAR; INTRAVENOUS AS NEEDED
Status: DISCONTINUED | OUTPATIENT
Start: 2024-07-06 | End: 2024-07-06 | Stop reason: SURG

## 2024-07-06 RX ORDER — FENTANYL CITRATE 50 UG/ML
INJECTION, SOLUTION INTRAMUSCULAR; INTRAVENOUS AS NEEDED
Status: DISCONTINUED | OUTPATIENT
Start: 2024-07-06 | End: 2024-07-06 | Stop reason: SURG

## 2024-07-06 RX ORDER — SODIUM CHLORIDE 0.9 % (FLUSH) 0.9 %
3-10 SYRINGE (ML) INJECTION AS NEEDED
Status: DISCONTINUED | OUTPATIENT
Start: 2024-07-06 | End: 2024-07-06 | Stop reason: HOSPADM

## 2024-07-06 RX ORDER — SODIUM CHLORIDE 0.9 % (FLUSH) 0.9 %
3 SYRINGE (ML) INJECTION EVERY 12 HOURS SCHEDULED
Status: DISCONTINUED | OUTPATIENT
Start: 2024-07-06 | End: 2024-07-06 | Stop reason: HOSPADM

## 2024-07-06 RX ORDER — LABETALOL HYDROCHLORIDE 5 MG/ML
5 INJECTION, SOLUTION INTRAVENOUS
Status: DISCONTINUED | OUTPATIENT
Start: 2024-07-06 | End: 2024-07-06 | Stop reason: HOSPADM

## 2024-07-06 RX ORDER — OXYCODONE AND ACETAMINOPHEN 10; 325 MG/1; MG/1
1 TABLET ORAL EVERY 4 HOURS PRN
Status: DISCONTINUED | OUTPATIENT
Start: 2024-07-06 | End: 2024-07-06 | Stop reason: HOSPADM

## 2024-07-06 RX ORDER — FENTANYL CITRATE 50 UG/ML
50 INJECTION, SOLUTION INTRAMUSCULAR; INTRAVENOUS
Status: DISCONTINUED | OUTPATIENT
Start: 2024-07-06 | End: 2024-07-06 | Stop reason: HOSPADM

## 2024-07-06 RX ORDER — ALBUTEROL SULFATE AND BUDESONIDE 90; 80 UG/1; UG/1
80 AEROSOL, METERED RESPIRATORY (INHALATION) 2 TIMES DAILY
COMMUNITY

## 2024-07-06 RX ORDER — ONDANSETRON 2 MG/ML
4 INJECTION INTRAMUSCULAR; INTRAVENOUS EVERY 6 HOURS PRN
Status: DISCONTINUED | OUTPATIENT
Start: 2024-07-06 | End: 2024-07-08 | Stop reason: HOSPADM

## 2024-07-06 RX ORDER — MAGNESIUM HYDROXIDE 1200 MG/15ML
LIQUID ORAL AS NEEDED
Status: DISCONTINUED | OUTPATIENT
Start: 2024-07-06 | End: 2024-07-06 | Stop reason: HOSPADM

## 2024-07-06 RX ORDER — DROPERIDOL 2.5 MG/ML
0.62 INJECTION, SOLUTION INTRAMUSCULAR; INTRAVENOUS ONCE AS NEEDED
Status: DISCONTINUED | OUTPATIENT
Start: 2024-07-06 | End: 2024-07-06 | Stop reason: HOSPADM

## 2024-07-06 RX ORDER — MIDAZOLAM HYDROCHLORIDE 1 MG/ML
1 INJECTION INTRAMUSCULAR; INTRAVENOUS
Status: DISCONTINUED | OUTPATIENT
Start: 2024-07-06 | End: 2024-07-06 | Stop reason: HOSPADM

## 2024-07-06 RX ORDER — ONDANSETRON 4 MG/1
4 TABLET, ORALLY DISINTEGRATING ORAL EVERY 6 HOURS PRN
Status: DISCONTINUED | OUTPATIENT
Start: 2024-07-06 | End: 2024-07-08 | Stop reason: HOSPADM

## 2024-07-06 RX ORDER — METOCLOPRAMIDE HYDROCHLORIDE 5 MG/ML
10 INJECTION INTRAMUSCULAR; INTRAVENOUS EVERY 6 HOURS PRN
Status: DISCONTINUED | OUTPATIENT
Start: 2024-07-06 | End: 2024-07-08 | Stop reason: HOSPADM

## 2024-07-06 RX ORDER — KETOROLAC TROMETHAMINE 30 MG/ML
30 INJECTION, SOLUTION INTRAMUSCULAR; INTRAVENOUS ONCE
Status: COMPLETED | OUTPATIENT
Start: 2024-07-06 | End: 2024-07-06

## 2024-07-06 RX ORDER — IBUPROFEN 600 MG/1
600 TABLET ORAL EVERY 6 HOURS PRN
Status: DISCONTINUED | OUTPATIENT
Start: 2024-07-06 | End: 2024-07-06 | Stop reason: HOSPADM

## 2024-07-06 RX ORDER — MORPHINE SULFATE 2 MG/ML
2 INJECTION, SOLUTION INTRAMUSCULAR; INTRAVENOUS ONCE
Status: COMPLETED | OUTPATIENT
Start: 2024-07-06 | End: 2024-07-06

## 2024-07-06 RX ORDER — PROMETHAZINE HYDROCHLORIDE 12.5 MG/1
12.5 SUPPOSITORY RECTAL EVERY 6 HOURS PRN
Status: DISCONTINUED | OUTPATIENT
Start: 2024-07-06 | End: 2024-07-08 | Stop reason: HOSPADM

## 2024-07-06 RX ORDER — MONTELUKAST SODIUM 10 MG/1
10 TABLET ORAL NIGHTLY
COMMUNITY

## 2024-07-06 RX ORDER — SODIUM CHLORIDE, SODIUM LACTATE, POTASSIUM CHLORIDE, CALCIUM CHLORIDE 600; 310; 30; 20 MG/100ML; MG/100ML; MG/100ML; MG/100ML
150 INJECTION, SOLUTION INTRAVENOUS CONTINUOUS
Status: DISCONTINUED | OUTPATIENT
Start: 2024-07-07 | End: 2024-07-08 | Stop reason: HOSPADM

## 2024-07-06 RX ORDER — SODIUM CHLORIDE, SODIUM LACTATE, POTASSIUM CHLORIDE, CALCIUM CHLORIDE 600; 310; 30; 20 MG/100ML; MG/100ML; MG/100ML; MG/100ML
150 INJECTION, SOLUTION INTRAVENOUS CONTINUOUS
Status: DISCONTINUED | OUTPATIENT
Start: 2024-07-06 | End: 2024-07-06

## 2024-07-06 RX ORDER — HYDROMORPHONE HYDROCHLORIDE 1 MG/ML
0.5 INJECTION, SOLUTION INTRAMUSCULAR; INTRAVENOUS; SUBCUTANEOUS
Status: DISCONTINUED | OUTPATIENT
Start: 2024-07-06 | End: 2024-07-06 | Stop reason: HOSPADM

## 2024-07-06 RX ADMIN — SODIUM CHLORIDE: 9 INJECTION, SOLUTION INTRAVENOUS at 17:20

## 2024-07-06 RX ADMIN — KETOROLAC TROMETHAMINE 30 MG: 30 INJECTION, SOLUTION INTRAMUSCULAR; INTRAVENOUS at 16:11

## 2024-07-06 RX ADMIN — CEFTRIAXONE 2 G: 1 INJECTION, POWDER, FOR SOLUTION INTRAMUSCULAR; INTRAVENOUS at 17:20

## 2024-07-06 RX ADMIN — ONDANSETRON 8 MG: 2 INJECTION INTRAMUSCULAR; INTRAVENOUS at 17:20

## 2024-07-06 RX ADMIN — PROPOFOL 200 MG: 10 INJECTION, EMULSION INTRAVENOUS at 17:22

## 2024-07-06 RX ADMIN — CEFTRIAXONE SODIUM 2000 MG: 2 INJECTION, POWDER, FOR SOLUTION INTRAMUSCULAR; INTRAVENOUS at 15:18

## 2024-07-06 RX ADMIN — SODIUM CHLORIDE, POTASSIUM CHLORIDE, SODIUM LACTATE AND CALCIUM CHLORIDE 100 ML/HR: 600; 310; 30; 20 INJECTION, SOLUTION INTRAVENOUS at 19:56

## 2024-07-06 RX ADMIN — ONDANSETRON 4 MG: 2 INJECTION INTRAMUSCULAR; INTRAVENOUS at 13:25

## 2024-07-06 RX ADMIN — MORPHINE SULFATE 2 MG: 2 INJECTION, SOLUTION INTRAMUSCULAR; INTRAVENOUS at 14:45

## 2024-07-06 RX ADMIN — IOPAMIDOL 100 ML: 612 INJECTION, SOLUTION INTRAVENOUS at 14:23

## 2024-07-06 RX ADMIN — FENTANYL CITRATE 100 MCG: 50 INJECTION, SOLUTION INTRAMUSCULAR; INTRAVENOUS at 17:22

## 2024-07-06 RX ADMIN — DROPERIDOL 1.25 MG: 2.5 INJECTION, SOLUTION INTRAMUSCULAR; INTRAVENOUS at 17:20

## 2024-07-06 RX ADMIN — DEXMEDETOMIDINE HYDROCHLORIDE 20 MCG: 4 INJECTION, SOLUTION INTRAVENOUS at 17:20

## 2024-07-06 RX ADMIN — SODIUM CHLORIDE, POTASSIUM CHLORIDE, SODIUM LACTATE AND CALCIUM CHLORIDE 1000 ML: 600; 310; 30; 20 INJECTION, SOLUTION INTRAVENOUS at 21:20

## 2024-07-06 RX ADMIN — KETOROLAC TROMETHAMINE 15 MG: 30 INJECTION, SOLUTION INTRAMUSCULAR; INTRAVENOUS at 21:20

## 2024-07-06 RX ADMIN — HYDROMORPHONE HYDROCHLORIDE 0.5 MG: 1 INJECTION, SOLUTION INTRAMUSCULAR; INTRAVENOUS; SUBCUTANEOUS at 19:09

## 2024-07-06 RX ADMIN — SODIUM CHLORIDE, POTASSIUM CHLORIDE, SODIUM LACTATE AND CALCIUM CHLORIDE: 600; 310; 30; 20 INJECTION, SOLUTION INTRAVENOUS at 17:46

## 2024-07-06 RX ADMIN — SODIUM CHLORIDE 1000 ML: 9 INJECTION, SOLUTION INTRAVENOUS at 13:25

## 2024-07-06 RX ADMIN — LIDOCAINE HYDROCHLORIDE 200 MG: 20 INJECTION, SOLUTION EPIDURAL; INFILTRATION; INTRACAUDAL; PERINEURAL at 17:22

## 2024-07-06 RX ADMIN — ONDANSETRON 4 MG: 2 INJECTION INTRAMUSCULAR; INTRAVENOUS at 14:50

## 2024-07-06 NOTE — ED PROVIDER NOTES
Subjective   History of Present Illness  Patient is a 38-year-old female who presents emergency department with complaints of lower abdominal pain.  Reports it as a sharp sensation.  Feels that it is worse in her right lower quadrant, but is having pain in her left lower quadrant as well on exam.  Reports that it started around 9 PM last night.  She is complaining of associated nausea and vomiting.  Denies diarrhea.  Denies fevers.  Denies urinary symptoms.  Denies vaginal bleeding or discharge.  Denies concern for pregnancy.  Patient still has her appendix.  Past surgical history of cholecystectomy.  Patient also reports that she has had a ruptured ovarian cyst in the past.        Review of Systems   Gastrointestinal:  Positive for abdominal pain, nausea and vomiting.   All other systems reviewed and are negative.      Past Medical History:   Diagnosis Date    Anxiety     Endometriosis     Insomnia     Patient denies medical problems     PONV (postoperative nausea and vomiting)     Spinal headache        No Known Allergies    Past Surgical History:   Procedure Laterality Date     SECTION       SECTION N/A 2017    Procedure:  SECTION REPEAT;  Surgeon: Kelly Meléndez DO;  Location: [facility] LABOR DELIVERY;  Service:     CHOLECYSTECTOMY WITH INTRAOPERATIVE CHOLANGIOGRAM N/A 2022    Procedure: CHOLECYSTECTOMY LAPAROSCOPIC WITH  PRE-OPERATIVE ICG INJECTION;  Surgeon: Coco Garnica MD;  Location: [facility] OR;  Service: General;  Laterality: N/A;    FOOT SURGERY      left    WISDOM TOOTH EXTRACTION         Family History   Problem Relation Age of Onset    Uterine cancer Maternal Grandmother 70    Lung cancer Maternal Grandmother     Lung cancer Maternal Uncle 44       Social History     Socioeconomic History    Marital status:    Tobacco Use    Smoking status: Never    Smokeless tobacco: Never   Vaping Use    Vaping status: Never Used   Substance and Sexual Activity     Alcohol use: Yes     Comment: occ    Drug use: No    Sexual activity: Yes     Partners: Male     Birth control/protection: Vasectomy     Comment: - vasectomy           Objective   Physical Exam  Vitals and nursing note reviewed.   Constitutional:       General: She is not in acute distress.     Appearance: She is well-developed and normal weight. She is not ill-appearing or toxic-appearing.   HENT:      Head: Normocephalic.   Cardiovascular:      Rate and Rhythm: Normal rate and regular rhythm.      Pulses: Normal pulses.      Heart sounds: Normal heart sounds.   Pulmonary:      Effort: Pulmonary effort is normal.      Breath sounds: Normal breath sounds.   Abdominal:      General: Abdomen is flat. Bowel sounds are normal. There is no distension.      Palpations: Abdomen is soft.      Tenderness: There is abdominal tenderness (lower abdomen).   Musculoskeletal:         General: Normal range of motion.      Cervical back: Normal range of motion and neck supple.   Skin:     General: Skin is warm and dry.   Neurological:      General: No focal deficit present.      Mental Status: She is alert and oriented to person, place, and time. Mental status is at baseline.   Psychiatric:         Mood and Affect: Mood normal.         Behavior: Behavior normal.         Thought Content: Thought content normal.         Judgment: Judgment normal.         Procedures           ED Course  ED Course as of 07/06/24 1617   Sat Jul 06, 2024   1507 Call has been placed to urology at this time.    CT abdomen pelvis revealed IMPRESSION:  1. Moderate RIGHT side obstruction based on a 6 x 5 mm stone at the RIGHT ureterovesical junction.    Labs have been reviewed.  CBC with leukocytosis, stable H&H.  CMP unremarkable.  Lactic acid 2.6, reflex pending.  Magnesium normal.  Lipase normal.  Negative hCG.  Urinalysis with positive nitrate, 4+ bacteria, too numerous to count RBCs.  Patient has been given IV fluids, IV Zofran, IV morphine, IV  Rocephin in the ER. [KR]   1508 Patient with a infected stone given fluids and antibiotics.  Will need admission due to her infected stone.  White count of 14.  Lactate 2.6 with repeat pending. [JJ]   1554 I have discussed this case with Dr. Cee, on-call urologist who plans to look at the OR schedule for possible surgery. [KR]   1557 Dr. Cee has returned my call.  He plans to take her to surgery.  Patient will need to be admitted under Dr. Love's service. Call has been placed to Dr. Love at this time. Discussed with patient who is in agreement to plan of care. [KR]   1616 Case has been discussed with Dr. Godfrey who is on-call for Dr. Love.  He has agreed to accept this patient for further management.  Patient will be admitted under Dr. Love's service. [KR]      ED Course User Index  [JJ] Broderick Perez MD  [KR] Juhi Rod APRN                                             Medical Decision Making  Problems Addressed:  Ureteral stone: complicated acute illness or injury  Urinary tract infection without hematuria, site unspecified: complicated acute illness or injury    Amount and/or Complexity of Data Reviewed  Labs: ordered.  Radiology: ordered.    Risk  Prescription drug management.  Decision regarding hospitalization.        Final diagnoses:   Ureteral stone   Urinary tract infection without hematuria, site unspecified       ED Disposition  ED Disposition       ED Disposition   Decision to Admit    Condition   --    Comment   Level of Care: Med/Surg [1]   Diagnosis: Ureteral stone [193364]   Admitting Physician: AMAURY LOVE [[id number]]                 No follow-up provider specified.       Medication List      No changes were made to your prescriptions during this visit.            Juhi Rod APRN  07/06/24 [id number]

## 2024-07-06 NOTE — CONSULTS
[facility]   Consult Note    Patient Name: Luda Nguyen  : 1985  MRN: [id number]  Primary Care Physician:  Anthony Millan MD  Referring Physician: No ref. provider found  Date of admission: 2024    Consults  Subjective   Subjective     Reason for Consult/ Chief Complaint: Right Ureteral Stone/UTI    History of Present Illness  Luda Nguyen is a 38 y.o. female presents with 1 day history of right lower quadrant right flank pain with associated nausea and vomiting.  CT shows 6 mm distal right ureteral stone.  Urinalysis consistent with 4+ bacteria.  Patient afebrile vital signs are stable.    Review of Systems   Constitutional:  Negative for chills and fever.   Gastrointestinal:  Positive for abdominal distention, nausea and vomiting. Negative for abdominal pain, anal bleeding and blood in stool.   Genitourinary:  Positive for flank pain. Negative for dysuria and hematuria.        Personal History     Past Medical History:   Diagnosis Date    Anxiety     Endometriosis     Insomnia     Patient denies medical problems     PONV (postoperative nausea and vomiting)     Spinal headache        Past Surgical History:   Procedure Laterality Date     SECTION       SECTION N/A 2017    Procedure:  SECTION REPEAT;  Surgeon: Kelly Meléndez DO;  Location: [facility] LABOR DELIVERY;  Service:     CHOLECYSTECTOMY WITH INTRAOPERATIVE CHOLANGIOGRAM N/A 2022    Procedure: CHOLECYSTECTOMY LAPAROSCOPIC WITH  PRE-OPERATIVE ICG INJECTION;  Surgeon: Coco Garnica MD;  Location: [facility] OR;  Service: General;  Laterality: N/A;    FOOT SURGERY      left    WISDOM TOOTH EXTRACTION         Family History: family history includes Lung cancer in her maternal grandmother; Lung cancer (age of onset: 44) in her maternal uncle; Uterine cancer (age of onset: 70) in her maternal grandmother. Otherwise pertinent FHx was reviewed and not pertinent to current issue.    Social History:   reports that she has never smoked. She has never used smokeless tobacco. She reports current alcohol use. She reports that she does not use drugs.    Home Medications:   Albuterol-Budesonide, LORazepam, Relugolix-Estradiol-Norethind, iron polysacch complex-B12-FA, montelukast, multivitamin with minerals, and traZODone    Allergies:  No Known Allergies    Objective    Objective     Vitals:  Temp:  [98.2 °F (36.8 °C)-102.1 °F (38.9 °C)] 102.1 °F (38.9 °C)  Heart Rate:  [70-93] 93  Resp:  [18] 18  BP: (104-121)/(62-75) 121/62    Physical Exam  Vitals reviewed.   Constitutional:       General: She is not in acute distress.     Appearance: She is well-developed. She is not diaphoretic.   Pulmonary:      Effort: Pulmonary effort is normal.   Abdominal:      General: There is no distension.      Palpations: Abdomen is soft. There is no mass.      Tenderness: There is abdominal tenderness. There is right CVA tenderness and guarding. There is no rebound.      Hernia: No hernia is present.   Skin:     General: Skin is warm and dry.   Neurological:      Mental Status: She is alert and oriented to person, place, and time.         Result Review    Result Review:  I have personally reviewed the results from the time of this admission to 7/6/2024 17:05 CDT and agree with these findings:  [x]  Laboratory list / accordion  []  Microbiology  [x]  Radiology  []  EKG/Telemetry   []  Cardiology/Vascular   []  Pathology  []  Old records  []  Other:  Most notable findings include: Lactic acid 3.6 previous lactic acid 2.6  Urine culture pending  Urinalysis trace blood large leuk esterase nitrite positive  Microscopic urinalysis too numerous to count white blood cells 0 3-5 RBCs 4+ bacteria  Creatinine 0.99  White blood cell count 14,000    CT independent review    The CT scan of the abdomen/pelvis done with and without contrast is available for me to review.  Treatment recommendations require an independent review.  First I scanned the  liver, spleen, and bowel pattern.  The retroperitoneum including the major vessels and lymphatic packages are briefly reviewed.  This film has been reviewed by the radiologist to determine any nonurologic abnormalities that are present.  The kidneys are closely inspected for size, symmetry, contour, parenchymal thickness, perinephric reaction, presence of calcifications, and intrarenal dilation of the collecting system.  The ureters are inspected for their course, caliber, and any calcifications.  The bladder is inspected for its thickness, size, and presence of any calcifications.  This scan shows:    The right kidney appears hydronephrosis down to a level of 6 mm distal ureteral stone at the level of the UVJ.    The left kidney appears normal on this contrasted CT scan.  The renal parenchymal is normal in thickness.  There are no solid masses or cysts.  There is no hydronephrosis.  There are no stones.      The bladder appears normal on this non-contrasted CT scan.  The bladder appears normal in thickness.  There no masses or stones seen on this exam.        Assessment & Plan   Assessment / Plan     Brief Patient Summary:  Luda Nguyen is a 38 y.o. female who 6 mm distal right ureteral stone with hydronephrosis and urinary tract infection    Active Hospital Problems:  Active Hospital Problems    Diagnosis     **Ureteral stone      Plan:   Discussed with patient.  She has evidence of urinary tract infection and an ureteral obstruction secondary to a stone.  Will plan for cystoscopy and right ureteral stent possible right ureteroscopy laser lithotripsy.  Her stone is distal and if there is no purulent urine noted initially on wire placement we will proceed with limited laser and basket extraction of stone as this is a distal stone.  Patient voiced understanding.  She will need likely 2 weeks of antibiotics and stent removal at a later date.  If there is purulent urine at initial wire placement will plan for stent  and definitive ureteroscopy in approximately 2 weeks.    Ureteroscopy  The patient has a distal ureteral calculus as defined by symptoms and radiographic studies.  Options for the management of this stone are discussed based upon size, location, symptoms, and probable composition including watchful waiting, expulsive therapy, ESWL, ureteral stenting, percutaneous management, and open approaches.  Based upon this discussed, The patient has elected to proceed with ureteroscopic management of the stone.  Ms. Nguyen understands that a laser or other fragmentation aid may be needed.  The need for ureteral stenting and subsequent removal is also discussed.  Risks of bleeding, infection, damage to urethra or bladder, ureteral perforation or avulsion, pain, and post operative stent discomfort are all discussed.  I discussed the need for return follow up for stent removal, and that failure to do so could result in significant infection, further stone formation, need for surgical intervention to remove the stent, or permanent kidney damage.  All questions were answered to the patient's satifaction     Feroz Cee MD

## 2024-07-06 NOTE — ED NOTES
Nursing report ED to floor  Luda Nguyen  38 y.o.  female    HPI:   Chief Complaint   Patient presents with    Abdominal Pain       Admitting doctor:   Anthony Millan MD    Consulting provider(s):  Consults       No orders found from 6/7/2024 to 7/7/2024.             Admitting diagnosis:   The primary encounter diagnosis was Ureteral stone. A diagnosis of Urinary tract infection without hematuria, site unspecified was also pertinent to this visit.    Code status:   Current Code Status       Date Active Code Status Order ID Comments User Context       Prior            Allergies:   Patient has no known allergies.    Intake and Output    Intake/Output Summary (Last 24 hours) at 7/6/2024 1643  Last data filed at 7/6/2024 1608  Gross per 24 hour   Intake 1100 ml   Output --   Net 1100 ml       Weight:       07/06/24  1326   Weight: 62.6 kg (138 lb)       Most recent vitals:   Vitals:    07/06/24 1459 07/06/24 1547 07/06/24 1608 07/06/24 1629   BP: 109/69 113/75  120/66   Pulse: 72 80  91   Resp:       Temp:   98.2 °F (36.8 °C)    TempSrc:   Oral    SpO2: 99% 100%  100%   Weight:       Height:         Oxygen Therapy: .    Active LDAs/IV Access:   Lines, Drains & Airways       Active LDAs       Name Placement date Placement time Site Days    Peripheral IV 07/06/24 1320 Anterior;Left;Proximal Forearm 07/06/24  1320  Forearm  less than 1                    Labs (abnormal labs have a star):   Labs Reviewed   COMPREHENSIVE METABOLIC PANEL - Abnormal; Notable for the following components:       Result Value    Glucose 105 (*)     All other components within normal limits    Narrative:     GFR Normal >60  Chronic Kidney Disease <60  Kidney Failure <15     URINALYSIS W/ CULTURE IF INDICATED - Abnormal; Notable for the following components:    Blood, UA Trace (*)     Protein, UA Trace (*)     Leuk Esterase, UA Large (3+) (*)     Nitrite, UA Positive (*)     All other components within normal limits    Narrative:     In  absence of clinical symptoms, the presence of pyuria, bacteria, and/or nitrites on the urinalysis result does not correlate with infection.   CBC WITH AUTO DIFFERENTIAL - Abnormal; Notable for the following components:    WBC 14.30 (*)     Neutrophil % 95.8 (*)     Lymphocyte % 2.3 (*)     Monocyte % 1.6 (*)     Eosinophil % 0.0 (*)     Neutrophils, Absolute 13.69 (*)     Lymphocytes, Absolute 0.33 (*)     All other components within normal limits   LACTIC ACID, PLASMA - Abnormal; Notable for the following components:    Lactate 2.6 (*)     All other components within normal limits   LACTIC ACID, REFLEX - Abnormal; Notable for the following components:    Lactate 3.6 (*)     All other components within normal limits   URINALYSIS, MICROSCOPIC ONLY - Abnormal; Notable for the following components:    RBC, UA 3-5 (*)     WBC, UA Too Numerous to Count (*)     Bacteria, UA 4+ (*)     All other components within normal limits   LIPASE - Normal   HCG, SERUM, QUALITATIVE - Normal   MAGNESIUM - Normal   URINE CULTURE   LACTIC ACID, REFLEX   CBC AND DIFFERENTIAL    Narrative:     The following orders were created for panel order CBC & Differential.  Procedure                               Abnormality         Status                     ---------                               -----------         ------                     CBC Auto Differential[[id number]]        Abnormal            Final result                 Please view results for these tests on the individual orders.       Meds given in ED:   Medications   sodium chloride 0.9 % flush 10 mL (has no administration in time range)   sodium chloride 0.9 % bolus 1,000 mL (0 mL Intravenous Stopped 7/6/24 1518)   ondansetron (ZOFRAN) injection 4 mg (4 mg Intravenous Given 7/6/24 1325)   Morphine sulfate (PF) injection 2 mg (2 mg Intravenous Given 7/6/24 1445)   iopamidol (ISOVUE-300) 61 % injection 100 mL (100 mL Intravenous Given 7/6/24 1423)   ondansetron (ZOFRAN) injection 4 mg  (4 mg Intravenous Given 7/6/24 1450)   cefTRIAXone (ROCEPHIN) 2,000 mg in sodium chloride 0.9 % 100 mL MBP (0 mg Intravenous Stopped 7/6/24 1608)   ketorolac (TORADOL) injection 30 mg (30 mg Intravenous Given 7/6/24 1611)           NIH Stroke Scale:       Isolation/Infection(s):  No active isolations   No active infections     COVID Testing  Collected .  Resulted .    Nursing report ED to floor:  Mental status: .A&Ox4  Ambulatory status: .up adlib  Precautions: .none    ED nurse phone extentsion- .. [phone]  Report given to TIANA Goff

## 2024-07-06 NOTE — BRIEF OP NOTE
CYSTOSCOPY URETEROSCOPY LASER LITHOTRIPSY  Progress Note    Luda Nguyen  7/6/2024    Pre-op Diagnosis:   Right ureteral stone urinary tract infection       Post-Op Diagnosis Codes:     * Ureteral stone [N20.1]    Procedure/CPT® Codes:        Procedure(s):  RIGHT URETEROSCOPY, RIGHT RETROGRADE PYLOGRAM, RIGHT URETERAL BALLOON DILATION, RIGHT BASKET EXTRACATION OF STONE, RIGHT URETERAL STENT              Surgeon(s):  Feroz Cee MD    Anesthesia: General    Staff:   Scrub Person: Cara Gray RN; Miller Angel; Latricia Hopkins           Urine Voided: * No values recorded between 7/6/2024  5:19 PM and 7/6/2024  5:47 PM *    Specimens:                Specimens       ID Source Type Tests Collected By Collected At Frozen?    A Ureter, Right Calculus TISSUE PATHOLOGY EXAM   Feroz Cee MD 7/6/24 0597     Description: RIGHT URETERAL STONE                  Drains: * No LDAs found *    Findings: Right retrograde pyelogram read: 5 Vietnamese open-ended catheter was placed over Precef a 0.38 sensor tip wire 10 cc dilute contrast used to outline the collecting system and a pulldown fashion.  The ureter was dilated down to the distal ureter with moderate dilation of the ureter and moderate dilation renal pelvis with blunting of the calyces.                  Feroz Cee MD     Date: 7/6/2024  Time: 17:48 CDT

## 2024-07-06 NOTE — OP NOTE
CYSTOSCOPY URETEROSCOPY LASER LITHOTRIPSY  Procedure Note    Luda Nguyen  7/6/2024    Pre-op Diagnosis:   * No pre-op diagnosis entered *    Post-op Diagnosis:     Post-Op Diagnosis Codes:     * Ureteral stone [N20.1]    Procedure/CPT® Codes:      Procedure(s):  RIGHT URETEROSCOPY, RIGHT RETROGRADE PYLOGRAM, RIGHT URETERAL BALLOON DILATION, RIGHT BASKET EXTRACATION OF STONE, RIGHT URETERAL STENT    Surgeon(s):  Feroz Cee MD    Anesthesia: General    Staff:   Scrub Person: Cara Gray RN; Miller Angel; Latricia Hopkins    Estimated Blood Loss: minimal    Specimens:                Specimens       ID Source Type Tests Collected By Collected At Frozen?    A Ureter, Right Calculus TISSUE PATHOLOGY EXAM   Feroz Cee MD 7/6/24 0911     Description: RIGHT URETERAL STONE              Drains: * No LDAs found *    Findings: Stenotic distal ureter requiring dilation  Purulent urine noted from right kidney  Stone extracted with basket with minimal manipulation and no laser lithotripsy  6 Icelandic 22 cm soft Tria stent in good position    Complications: None    Indications: 38-year-old female presented with a 6 mm distal right ureteral stone with 4+ bacteria.  She was febrile in the emergency room and had increasing lactic acid.  She was emergently brought to the operating room for decompression of her collecting system.    Description of Procedure:   After informed consent was obtained, patient was brought to the operating room where patient underwent general endotracheal anesthesia in the supine position.  Patient was converted to the dorsolithotomy position.  Prepped and draped in the usual sterile fashion.  Timeout was done to ensure the correct patient, procedure, and site.  Patient did receive preoperative antibiotics in the holding area.     23 Icelandic Verdugo endoscope inserted urethra in a retrograde fashion.  The bladder was entered and drained.  There was no lesions noted.  Ureteral orifice  ease were normal location.  She had a very tight stenotic appearing right ureter.  0.38 sensor tip wire was placed up into the collecting system under fluoroscopic guidance.  This was taken of the entire ureteral orifice and I felt this had to be dilated.  A 15 [place] [employer] balloon dilator was then placed into the distal ureter and dilated to 12 mmHg and held for a minute.  This was taken down.  Immediately purulent urine returned.  As this was a distal stone, I opted to place a rigid ureteroscope with plans for basket extraction of stone and stenting.  I did not want to use laser lithotripsy for fear of too much stone manipulation as to make the patient more ill.  I then placed the semirigid ureteroscope into the distal ureter where the stone was encountered.  A 1.9 Bruneian 0 tip nitinol basket was used to grasp this and the stone was removed intact.  This was sent off for pathologic analysis.  I then inspected the ureter in its entirety and saw no further evidence of stone disease.  The semirigid ureteroscope was removed.  I then backloaded the cystoscope over the wire.  I instilled 10 cc dilute contrast in a pulldown fashion.  Findings are dictated the brief operative report.  I then placed a wire back up.  A 6 [place]ian 22 cm soft Tria stent was then placed in good position.  The bladder was drained.  The scope was removed.  The patient was awakened from anesthesia and transferred to the recovery room in satisfactory condition.    Feroz Cee MD     Date: 7/6/2024  Time: 17:49 CDT

## 2024-07-06 NOTE — ANESTHESIA PREPROCEDURE EVALUATION
Anesthesia Evaluation     Patient summary reviewed and Nursing notes reviewed   history of anesthetic complications:  PONV  NPO Solid Status: > 8 hours  NPO Liquid Status: > 8 hours           Airway   Mallampati: I  TM distance: >3 FB  Neck ROM: full  No difficulty expected  Dental - normal exam     Pulmonary - negative pulmonary ROS and normal exam   Cardiovascular - negative cardio ROS and normal exam        Neuro/Psych  (+) headaches, psychiatric history Anxiety and Depression  GI/Hepatic/Renal/Endo      Musculoskeletal (-) negative ROS    Abdominal  - normal exam   Substance History - negative use     OB/GYN negative ob/gyn ROS         Other - negative ROS                   Anesthesia Plan    ASA 2 - emergent     general     intravenous induction     Anesthetic plan, risks, benefits, and alternatives have been provided, discussed and informed consent has been obtained with: patient.    CODE STATUS:

## 2024-07-06 NOTE — ANESTHESIA POSTPROCEDURE EVALUATION
"Patient: Luda Nguyen    Procedure Summary       Date: 07/06/24 Room / Location:  [facility]  /  [facility]    Anesthesia Start: 1720 Anesthesia Stop: 1754    Procedure: RIGHT URETEROSCOPY, RIGHT RETROGRADE PYELOGRAM, RIGHT URETERAL BALLOON DILATION, RIGHT BASKET EXTRACTION OF STONE, RIGHT URETERAL STENT (Right: Ureter) Diagnosis: Ureteral stone    Surgeons: Feroz Cee MD Provider: Nima Gale CRNA    Anesthesia Type: general ASA Status: 2 - Emergent            Anesthesia Type: general    Vitals  Vitals Value Taken Time   /53 07/06/24 1751   Temp     Pulse 99 07/06/24 1754   Resp     SpO2 100 % 07/06/24 1754   Vitals shown include unfiled device data.        Post Anesthesia Care and Evaluation    Patient location during evaluation: PACU  Patient participation: complete - patient participated  Level of consciousness: awake and alert  Pain management: adequate    Airway patency: patent  Anesthetic complications: No anesthetic complications    Cardiovascular status: acceptable  Respiratory status: acceptable  Hydration status: acceptable    Comments: Blood pressure 121/62, pulse 93, temperature (!) 102.1 °F (38.9 °C), temperature source Oral, resp. rate 18, height 167.6 cm (66\"), weight 62.6 kg (138 lb), SpO2 97%, not currently breastfeeding.    Pt discharged from PACU based on em score >8    "

## 2024-07-07 ENCOUNTER — APPOINTMENT (OUTPATIENT)
Dept: GENERAL RADIOLOGY | Facility: HOSPITAL | Age: 39
End: 2024-07-07
Payer: COMMERCIAL

## 2024-07-07 PROBLEM — A41.9 SEPSIS WITHOUT ACUTE ORGAN DYSFUNCTION: Status: ACTIVE | Noted: 2024-07-07

## 2024-07-07 PROBLEM — N17.9 AKI (ACUTE KIDNEY INJURY): Status: ACTIVE | Noted: 2024-07-07

## 2024-07-07 PROBLEM — N11.1 OBSTRUCTIVE PYELONEPHRITIS: Status: ACTIVE | Noted: 2024-07-07

## 2024-07-07 LAB
ANION GAP SERPL CALCULATED.3IONS-SCNC: 10 MMOL/L (ref 5–15)
BUN SERPL-MCNC: 20 MG/DL (ref 6–20)
BUN/CREAT SERPL: 15.5 (ref 7–25)
CALCIUM SPEC-SCNC: 7.6 MG/DL (ref 8.6–10.5)
CHLORIDE SERPL-SCNC: 102 MMOL/L (ref 98–107)
CO2 SERPL-SCNC: 23 MMOL/L (ref 22–29)
CREAT SERPL-MCNC: 1.29 MG/DL (ref 0.57–1)
D-LACTATE SERPL-SCNC: 2.2 MMOL/L (ref 0.5–2)
D-LACTATE SERPL-SCNC: 2.4 MMOL/L (ref 0.5–2)
DEPRECATED RDW RBC AUTO: 45.7 FL (ref 37–54)
EGFRCR SERPLBLD CKD-EPI 2021: 54.6 ML/MIN/1.73
ERYTHROCYTE [DISTWIDTH] IN BLOOD BY AUTOMATED COUNT: 13.5 % (ref 12.3–15.4)
GLUCOSE SERPL-MCNC: 105 MG/DL (ref 65–99)
HCT VFR BLD AUTO: 31.2 % (ref 34–46.6)
HGB BLD-MCNC: 10 G/DL (ref 12–15.9)
MCH RBC QN AUTO: 29.5 PG (ref 26.6–33)
MCHC RBC AUTO-ENTMCNC: 32.1 G/DL (ref 31.5–35.7)
MCV RBC AUTO: 92 FL (ref 79–97)
PLATELET # BLD AUTO: 156 10*3/MM3 (ref 140–450)
PMV BLD AUTO: 10.7 FL (ref 6–12)
POTASSIUM SERPL-SCNC: 4 MMOL/L (ref 3.5–5.2)
RBC # BLD AUTO: 3.39 10*6/MM3 (ref 3.77–5.28)
SODIUM SERPL-SCNC: 135 MMOL/L (ref 136–145)
WBC NRBC COR # BLD AUTO: 18.62 10*3/MM3 (ref 3.4–10.8)

## 2024-07-07 PROCEDURE — 25810000003 LACTATED RINGERS PER 1000 ML: Performed by: FAMILY MEDICINE

## 2024-07-07 PROCEDURE — 74018 RADEX ABDOMEN 1 VIEW: CPT

## 2024-07-07 PROCEDURE — 99232 SBSQ HOSP IP/OBS MODERATE 35: CPT | Performed by: UROLOGY

## 2024-07-07 PROCEDURE — 83605 ASSAY OF LACTIC ACID: CPT

## 2024-07-07 PROCEDURE — 80048 BASIC METABOLIC PNL TOTAL CA: CPT | Performed by: UROLOGY

## 2024-07-07 PROCEDURE — 87040 BLOOD CULTURE FOR BACTERIA: CPT | Performed by: FAMILY MEDICINE

## 2024-07-07 PROCEDURE — 85027 COMPLETE CBC AUTOMATED: CPT | Performed by: UROLOGY

## 2024-07-07 PROCEDURE — G0378 HOSPITAL OBSERVATION PER HR: HCPCS

## 2024-07-07 PROCEDURE — 25010000002 KETOROLAC TROMETHAMINE PER 15 MG: Performed by: UROLOGY

## 2024-07-07 PROCEDURE — 25010000002 CEFTRIAXONE PER 250 MG: Performed by: FAMILY MEDICINE

## 2024-07-07 RX ORDER — KETOROLAC TROMETHAMINE 10 MG/1
10 TABLET, FILM COATED ORAL EVERY 6 HOURS PRN
Qty: 12 TABLET | Refills: 0 | Status: SHIPPED | OUTPATIENT
Start: 2024-07-07

## 2024-07-07 RX ORDER — OXYBUTYNIN CHLORIDE 5 MG/1
5 TABLET, EXTENDED RELEASE ORAL DAILY
Qty: 14 TABLET | Refills: 0 | Status: SHIPPED | OUTPATIENT
Start: 2024-07-07

## 2024-07-07 RX ORDER — TAMSULOSIN HYDROCHLORIDE 0.4 MG/1
1 CAPSULE ORAL NIGHTLY
Qty: 14 CAPSULE | Refills: 0 | Status: SHIPPED | OUTPATIENT
Start: 2024-07-07

## 2024-07-07 RX ORDER — NALOXONE HYDROCHLORIDE 4 MG/.1ML
SPRAY NASAL
Qty: 2 EACH | Refills: 0 | Status: SHIPPED | OUTPATIENT
Start: 2024-07-07

## 2024-07-07 RX ORDER — HYDROCODONE BITARTRATE AND ACETAMINOPHEN 5; 325 MG/1; MG/1
1 TABLET ORAL EVERY 6 HOURS PRN
Qty: 12 TABLET | Refills: 0 | Status: SHIPPED | OUTPATIENT
Start: 2024-07-07

## 2024-07-07 RX ORDER — SIMETHICONE 80 MG
80 TABLET,CHEWABLE ORAL 4 TIMES DAILY PRN
Status: DISCONTINUED | OUTPATIENT
Start: 2024-07-07 | End: 2024-07-08 | Stop reason: HOSPADM

## 2024-07-07 RX ADMIN — KETOROLAC TROMETHAMINE 15 MG: 30 INJECTION, SOLUTION INTRAMUSCULAR; INTRAVENOUS at 22:11

## 2024-07-07 RX ADMIN — KETOROLAC TROMETHAMINE 15 MG: 30 INJECTION, SOLUTION INTRAMUSCULAR; INTRAVENOUS at 09:47

## 2024-07-07 RX ADMIN — SODIUM CHLORIDE, POTASSIUM CHLORIDE, SODIUM LACTATE AND CALCIUM CHLORIDE 150 ML/HR: 600; 310; 30; 20 INJECTION, SOLUTION INTRAVENOUS at 01:24

## 2024-07-07 RX ADMIN — SIMETHICONE 80 MG: 80 TABLET, CHEWABLE ORAL at 18:50

## 2024-07-07 RX ADMIN — SODIUM CHLORIDE, POTASSIUM CHLORIDE, SODIUM LACTATE AND CALCIUM CHLORIDE 150 ML/HR: 600; 310; 30; 20 INJECTION, SOLUTION INTRAVENOUS at 06:13

## 2024-07-07 RX ADMIN — TAMSULOSIN HYDROCHLORIDE 0.4 MG: 0.4 CAPSULE ORAL at 08:30

## 2024-07-07 RX ADMIN — HYDROCODONE BITARTRATE AND ACETAMINOPHEN 1 TABLET: 5; 325 TABLET ORAL at 18:25

## 2024-07-07 RX ADMIN — HYOSCYAMINE SULFATE 250 MCG: 0.12 TABLET SUBLINGUAL at 18:38

## 2024-07-07 RX ADMIN — KETOROLAC TROMETHAMINE 15 MG: 30 INJECTION, SOLUTION INTRAMUSCULAR; INTRAVENOUS at 04:38

## 2024-07-07 RX ADMIN — SODIUM CHLORIDE, POTASSIUM CHLORIDE, SODIUM LACTATE AND CALCIUM CHLORIDE 150 ML/HR: 600; 310; 30; 20 INJECTION, SOLUTION INTRAVENOUS at 14:12

## 2024-07-07 RX ADMIN — CEFTRIAXONE SODIUM 2000 MG: 2 INJECTION, POWDER, FOR SOLUTION INTRAMUSCULAR; INTRAVENOUS at 17:05

## 2024-07-07 RX ADMIN — SODIUM CHLORIDE, POTASSIUM CHLORIDE, SODIUM LACTATE AND CALCIUM CHLORIDE 150 ML/HR: 600; 310; 30; 20 INJECTION, SOLUTION INTRAVENOUS at 20:57

## 2024-07-07 RX ADMIN — OXYBUTYNIN CHLORIDE 5 MG: 5 TABLET, EXTENDED RELEASE ORAL at 08:30

## 2024-07-07 RX ADMIN — KETOROLAC TROMETHAMINE 15 MG: 30 INJECTION, SOLUTION INTRAMUSCULAR; INTRAVENOUS at 17:05

## 2024-07-07 RX ADMIN — HYDROCODONE BITARTRATE AND ACETAMINOPHEN 1 TABLET: 5; 325 TABLET ORAL at 11:46

## 2024-07-07 NOTE — PLAN OF CARE
Admitted 7/6 fever (102.1 oral), R flank pain (6 mm ureteral stone), uti (4+ bacteria, 3+ leukocytes, + nitrites)  Rocephin 2gm daily  Urology consulted; R ureteroscoy, R retrograde hyelogram, R ureteral balloon dilation, R basket extraction of stone, R ureteral stent placed on 7/6  Oxybutinin and flomax to be started 7/7  SCDs  Problem: Adult Inpatient Plan of Care  Goal: Plan of Care Review  7/7/2024 0322 by Jovi Max RN  Outcome: Ongoing, Progressing  7/7/2024 0322 by Jovi Max RN  Outcome: Ongoing, Progressing  Flowsheets (Taken 7/7/2024 0322)  Plan of Care Reviewed With: patient     Problem: Pain (Surgery Nonspecified)  Goal: Acceptable Pain Control  Outcome: Ongoing, Progressing     Problem: UTI (Urinary Tract Infection)  Goal: Improved Infection Symptoms  Outcome: Ongoing, Progressing     Problem: Adult Inpatient Plan of Care  Goal: Absence of Hospital-Acquired Illness or Injury  Intervention: Identify and Manage Fall Risk  Recent Flowsheet Documentation  Taken 7/7/2024 0200 by Jovi Max RN  Safety Promotion/Fall Prevention: safety round/check completed  Taken 7/7/2024 0000 by Jovi Max RN  Safety Promotion/Fall Prevention: safety round/check completed  Taken 7/6/2024 2300 by Jovi Max RN  Safety Promotion/Fall Prevention: safety round/check completed  Taken 7/6/2024 2200 by Jovi Max RN  Safety Promotion/Fall Prevention: safety round/check completed  Taken 7/6/2024 2120 by Jovi Max RN  Safety Promotion/Fall Prevention: safety round/check completed  Taken 7/6/2024 2000 by Jovi Max RN  Safety Promotion/Fall Prevention: safety round/check completed  Taken 7/6/2024 1920 by Jovi Max RN  Safety Promotion/Fall Prevention: safety round/check completed  Intervention: Prevent Skin Injury  Recent Flowsheet Documentation  Taken 7/6/2024 2120 by Jovi Max RN  Body Position: position changed independently  Intervention: Prevent and Manage VTE (Venous Thromboembolism) Risk  Recent Flowsheet  Documentation  Taken 7/6/2024 2120 by Jovi Max RN  VTE Prevention/Management:   bilateral   sequential compression devices on  Range of Motion: active ROM (range of motion) encouraged     Problem: Ongoing Anesthesia Effects (Surgery Nonspecified)  Goal: Anesthesia/Sedation Recovery  Intervention: Optimize Anesthesia Recovery  Recent Flowsheet Documentation  Taken 7/7/2024 0200 by Jovi Max RN  Safety Promotion/Fall Prevention: safety round/check completed  Taken 7/7/2024 0000 by Jovi Max RN  Safety Promotion/Fall Prevention: safety round/check completed  Taken 7/6/2024 2300 by Jovi Max RN  Safety Promotion/Fall Prevention: safety round/check completed  Taken 7/6/2024 2200 by Jovi Max RN  Safety Promotion/Fall Prevention: safety round/check completed  Taken 7/6/2024 2120 by Jovi Max RN  Safety Promotion/Fall Prevention: safety round/check completed  Taken 7/6/2024 2000 by Jovi Max RN  Safety Promotion/Fall Prevention: safety round/check completed  Taken 7/6/2024 1920 by Jovi Max RN  Safety Promotion/Fall Prevention: safety round/check completed   Goal Outcome Evaluation:  Plan of Care Reviewed With: patient

## 2024-07-07 NOTE — PROGRESS NOTES
[facility]     Progress Note    Patient Name: Luda Nguyen  : 1985  MRN: [id number]  Primary Care Physician:  Anthony Millan MD  Date of admission: 2024    Subjective   Subjective     Chief Complaint: Right ureteral stone/urinary tract infection    History of Present Illness  Patient Reports some lower abdominal soreness otherwise feeling better    Review of Systems    Objective   Objective     Vitals:   Temp:  [98.2 °F (36.8 °C)-102.1 °F (38.9 °C)] 98.3 °F (36.8 °C)  Heart Rate:  [] 83  Resp:  [16-23] 16  BP: ()/(46-98) 108/58  Flow (L/min):  [2-8] 2    Physical Exam  Vitals reviewed.   Constitutional:       General: She is not in acute distress.     Appearance: She is well-developed. She is not diaphoretic.   Pulmonary:      Effort: Pulmonary effort is normal.   Abdominal:      General: There is no distension.      Palpations: Abdomen is soft. There is no mass.      Tenderness: There is abdominal tenderness. There is no guarding or rebound.      Hernia: No hernia is present.   Skin:     General: Skin is warm and dry.   Neurological:      Mental Status: She is alert and oriented to person, place, and time.          Result Review    Result Review:  I have personally reviewed the results from the time of this admission to 2024 09:32 CDT and agree with these findings:  [x]  Laboratory list / accordion  []  Microbiology  []  Radiology  []  EKG/Telemetry   []  Cardiology/Vascular   []  Pathology  []  Old records  []  Other:  Most notable findings include: Blood and urine cultures pending    White blood cell count 18,000  Creatinine 1.29  Assessment & Plan   Assessment / Plan     Brief Patient Summary:  Luda Nguyen is a 38 y.o. female who right ureteral stone with pyelonephritis status post basket traction of stone and right ureteral stent    Active Hospital Problems:  Active Hospital Problems    Diagnosis     **Ureteral stone     Sepsis without acute organ dysfunction      Obstructive pyelonephritis     ELIZABETH (acute kidney injury)      Plan:   Will need 2-week course of antibiotics.  Will set up to have her stent removed in approximately 2 weeks.  Will send in prescriptions for stent pain/colic.  Antibiotics per PCP.    VTE Prophylaxis:  Mechanical VTE prophylaxis orders are present.        CODE STATUS:    Code Status (Patient has no pulse and is not breathing): CPR (Attempt to Resuscitate)  Medical Interventions (Patient has pulse or is breathing): Full Support    Disposition:  I expect patient to be discharged per primary team.    Feroz Cee MD

## 2024-07-07 NOTE — H&P
History and Physical    Patient:  Luda Nguyen  MRN: [id number]    CHIEF COMPLAINT: Right-sided abdominal pain, fever    History Obtained From: the patient   PCP: Anthony Millan MD    HISTORY OF PRESENT ILLNESS:   The patient is a 38 y.o. female who presents with acute onset of right flank pain and right-sided abdominal pain that began 48 hours prior to admission.  She felt like she was having a kidney stone which she has a history of.  She tried taking Aleve at home but started having episodes of emesis and her pain worsen.  Ultimately she presented to the ER after having fever and chills and right-sided pain that was severe and worsening 10 out of 10.  Sharp and stabbing in nature.  In the ER she was found to have obstructive pyelonephritis and taken directly to the OR.  Fortunately, her stone was able to be removed.  This morning she is resting much more comfortably.  Continues to have some right-sided abdominal pain but reports that it is dramatically improved.  No further fevers other than in the OR yesterday.    REVIEW OF SYSTEMS:    Review of Systems   Constitutional:  Positive for chills and fever.   HENT:  Negative for congestion and sore throat.    Respiratory:  Negative for cough and shortness of breath.    Cardiovascular:  Negative for chest pain and leg swelling.   Gastrointestinal:  Positive for abdominal pain, nausea and vomiting.   Genitourinary:  Positive for dysuria and flank pain. Negative for urgency.   Musculoskeletal:  Negative for back pain and joint swelling.   Neurological:  Negative for dizziness, seizures and numbness.   Psychiatric/Behavioral:  Negative for agitation and confusion.           Past Medical History:  Past Medical History:   Diagnosis Date    Anxiety     Endometriosis     Insomnia     Patient denies medical problems     PONV (postoperative nausea and vomiting)     Spinal headache        Past Surgical History:  Past Surgical History:   Procedure Laterality Date      SECTION       SECTION N/A 2017    Procedure:  SECTION REPEAT;  Surgeon: Kelly Meléndez DO;  Location:  [facility];  Service:     CHOLECYSTECTOMY WITH INTRAOPERATIVE CHOLANGIOGRAM N/A 2022    Procedure: CHOLECYSTECTOMY LAPAROSCOPIC WITH  PRE-OPERATIVE ICG INJECTION;  Surgeon: Coco Garnica MD;  Location:  [facility];  Service: General;  Laterality: N/A;    FOOT SURGERY      left    WISDOM TOOTH EXTRACTION         Medications Prior to Admission:    Medications Prior to Admission   Medication Sig Dispense Refill Last Dose    Albuterol-Budesonide (Airsupra) 90-80 MCG/ACT aerosol Inhale 80 mcg 2 (Two) Times a Day.       iron polysacch complex-B12-FA (FERREX 150 FORTE) 150-0.025-1 MG capsule capsule Take 1 capsule by mouth Daily.       montelukast (SINGULAIR) 10 MG tablet Take 1 tablet by mouth Every Night.       Multiple Vitamins-Minerals (MULTIVITAMIN ADULT PO) Take 1 tablet by mouth Daily.       Relugolix-Estradiol-Norethind (Myfembree) 40-1-0.5 MG tablet Take 1 tablet by mouth Daily. 30 tablet 3     LORazepam (ATIVAN) 1 MG tablet Take 1 tablet by mouth Daily As Needed for Anxiety.       traZODone (DESYREL) 50 MG tablet Take 1 tablet by mouth At Night As Needed for Sleep.          Allergies:  Patient has no known allergies.    Social History:   Social History     Socioeconomic History    Marital status:    Tobacco Use    Smoking status: Never    Smokeless tobacco: Never   Vaping Use    Vaping status: Never Used   Substance and Sexual Activity    Alcohol use: Yes     Comment: occ    Drug use: No    Sexual activity: Yes     Partners: Male     Birth control/protection: Vasectomy     Comment: - vasectomy       Family History:   Family History   Problem Relation Age of Onset    Uterine cancer Maternal Grandmother 70    Lung cancer Maternal Grandmother     Lung cancer Maternal Uncle 44           Physical Exam:    Vitals: /58 (BP Location: Right  "arm, Patient Position: Lying)   Pulse 83   Temp 98.3 °F (36.8 °C) (Oral)   Resp 16   Ht 167.6 cm (66\")   Wt 62.6 kg (138 lb)   SpO2 100%   BMI 22.27 kg/m²   Physical Exam  Constitutional:       Appearance: She is well-developed.   HENT:      Head: Normocephalic and atraumatic.   Eyes:      Conjunctiva/sclera: Conjunctivae normal.      Pupils: Pupils are equal, round, and reactive to light.   Cardiovascular:      Rate and Rhythm: Normal rate and regular rhythm.      Heart sounds: Normal heart sounds. No murmur heard.     No friction rub.   Pulmonary:      Effort: Pulmonary effort is normal. No respiratory distress.      Breath sounds: Normal breath sounds. No wheezing or rales.   Abdominal:      General: Bowel sounds are normal. There is no distension.      Palpations: Abdomen is soft.      Comments: Right lower quadrant tenderness to palpation   Musculoskeletal:         General: Normal range of motion.      Cervical back: Normal range of motion.   Skin:     Capillary Refill: Capillary refill takes less than 2 seconds.   Neurological:      Mental Status: She is alert and oriented to person, place, and time.      Cranial Nerves: No cranial nerve deficit.   Psychiatric:         Behavior: Behavior normal.           Lab Results (last 24 hours)       Procedure Component Value Units Date/Time    Blood Culture - Blood, Arm, Right [[id number]] Collected: 07/07/24 0707    Specimen: Blood from Arm, Right Updated: 07/07/24 0722    Blood Culture - Blood, Arm, Left [[id number]] Collected: 07/07/24 0711    Specimen: Blood from Arm, Left Updated: 07/07/24 0722    Basic Metabolic Panel [[id number]]  (Abnormal) Collected: 07/07/24 0350    Specimen: Blood Updated: 07/07/24 0555     Glucose 105 mg/dL      BUN 20 mg/dL      Creatinine 1.29 mg/dL      Sodium 135 mmol/L      Potassium 4.0 mmol/L      Chloride 102 mmol/L      CO2 23.0 mmol/L      Calcium 7.6 mg/dL      BUN/Creatinine Ratio 15.5     Anion Gap 10.0 mmol/L      eGFR 54.6 " mL/min/1.73     Narrative:      GFR Normal >60  Chronic Kidney Disease <60  Kidney Failure <15      STAT Lactic Acid, Reflex [[id number]]  (Abnormal) Collected: 07/07/24 0350    Specimen: Blood Updated: 07/07/24 0543     Lactate 2.4 mmol/L     CBC (No Diff) [[id number]]  (Abnormal) Collected: 07/07/24 0350    Specimen: Blood Updated: 07/07/24 0538     WBC 18.62 10*3/mm3      RBC 3.39 10*6/mm3      Hemoglobin 10.0 g/dL      Hematocrit 31.2 %      MCV 92.0 fL      MCH 29.5 pg      MCHC 32.1 g/dL      RDW 13.5 %      RDW-SD 45.7 fl      MPV 10.7 fL      Platelets 156 10*3/mm3     STAT Lactic Acid, Reflex [[id number]]  (Abnormal) Collected: 07/06/24 2259    Specimen: Blood Updated: 07/07/24 0009     Lactate 2.2 mmol/L     STAT Lactic Acid, Reflex [[id number]]  (Abnormal) Collected: 07/06/24 1924    Specimen: Blood Updated: 07/06/24 1949     Lactate 3.5 mmol/L     Pregnancy, Urine - Urine, Clean Catch [[id number]]  (Normal) Collected: 07/06/24 1440    Specimen: Urine, Clean Catch Updated: 07/06/24 1712     HCG, Urine QL Negative    STAT Lactic Acid, Reflex [[id number]]  (Abnormal) Collected: 07/06/24 1616    Specimen: Blood from Arm, Right Updated: 07/06/24 1643     Lactate 3.6 mmol/L     Urinalysis With Culture If Indicated - Urine, Clean Catch [[id number]]  (Abnormal) Collected: 07/06/24 1440    Specimen: Urine, Clean Catch Updated: 07/06/24 1453     Color, UA Yellow     Appearance, UA Clear     pH, UA 8.0     Specific Gravity, UA 1.022     Glucose, UA Negative     Ketones, UA Negative     Bilirubin, UA Negative     Blood, UA Trace     Protein, UA Trace     Leuk Esterase, UA Large (3+)     Nitrite, UA Positive     Urobilinogen, UA 1.0 E.U./dL    Narrative:      In absence of clinical symptoms, the presence of pyuria, bacteria, and/or nitrites on the urinalysis result does not correlate with infection.    Urinalysis, Microscopic Only - Urine, Clean Catch [[id number]]  (Abnormal) Collected: 07/06/24 1440    Specimen: Urine,  Clean Catch Updated: 07/06/24 1453     RBC, UA 3-5 /HPF      WBC, UA Too Numerous to Count /HPF      Bacteria, UA 4+ /HPF      Squamous Epithelial Cells, UA 0-2 /HPF      Hyaline Casts, UA 0-2 /LPF      Methodology Automated Microscopy    Urine Culture - Urine, Urine, Clean Catch [[id number]] Collected: 07/06/24 1440    Specimen: Urine, Clean Catch Updated: 07/06/24 1453    Lactic Acid, Plasma [[id number]]  (Abnormal) Collected: 07/06/24 1322    Specimen: Blood Updated: 07/06/24 1400     Lactate 2.6 mmol/L     Comprehensive Metabolic Panel [[id number]]  (Abnormal) Collected: 07/06/24 1322    Specimen: Blood Updated: 07/06/24 1359     Glucose 105 mg/dL      BUN 18 mg/dL      Creatinine 0.99 mg/dL      Sodium 139 mmol/L      Potassium 3.9 mmol/L      Chloride 102 mmol/L      CO2 25.0 mmol/L      Calcium 9.0 mg/dL      Total Protein 6.7 g/dL      Albumin 4.3 g/dL      ALT (SGPT) 12 U/L      AST (SGOT) 23 U/L      Alkaline Phosphatase 58 U/L      Total Bilirubin 0.8 mg/dL      Globulin 2.4 gm/dL      A/G Ratio 1.8 g/dL      BUN/Creatinine Ratio 18.2     Anion Gap 12.0 mmol/L      eGFR 75.0 mL/min/1.73     Narrative:      GFR Normal >60  Chronic Kidney Disease <60  Kidney Failure <15      Magnesium [[id number]]  (Normal) Collected: 07/06/24 1322    Specimen: Blood Updated: 07/06/24 1358     Magnesium 1.8 mg/dL     Lipase [[id number]]  (Normal) Collected: 07/06/24 1322    Specimen: Blood Updated: 07/06/24 1354     Lipase 34 U/L     hCG, Serum, Qualitative [[id number]]  (Normal) Collected: 07/06/24 1322    Specimen: Blood Updated: 07/06/24 1352     HCG Qualitative Negative    CBC & Differential [[id number]]  (Abnormal) Collected: 07/06/24 1322    Specimen: Blood Updated: 07/06/24 1337    Narrative:      The following orders were created for panel order CBC & Differential.  Procedure                               Abnormality         Status                     ---------                               -----------         ------                      CBC Auto Differential[[id number]]        Abnormal            Final result                 Please view results for these tests on the individual orders.    CBC Auto Differential [[id number]]  (Abnormal) Collected: 07/06/24 1322    Specimen: Blood Updated: 07/06/24 1337     WBC 14.30 10*3/mm3      RBC 4.15 10*6/mm3      Hemoglobin 12.4 g/dL      Hematocrit 36.6 %      MCV 88.2 fL      MCH 29.9 pg      MCHC 33.9 g/dL      RDW 13.1 %      RDW-SD 42.4 fl      MPV 9.6 fL      Platelets 212 10*3/mm3      Neutrophil % 95.8 %      Lymphocyte % 2.3 %      Monocyte % 1.6 %      Eosinophil % 0.0 %      Basophil % 0.1 %      Immature Grans % 0.2 %      Neutrophils, Absolute 13.69 10*3/mm3      Lymphocytes, Absolute 0.33 10*3/mm3      Monocytes, Absolute 0.23 10*3/mm3      Eosinophils, Absolute 0.00 10*3/mm3      Basophils, Absolute 0.02 10*3/mm3      Immature Grans, Absolute 0.03 10*3/mm3      nRBC 0.0 /100 WBC              -----------------------------------------------------------------    Radiology:     FL Retrograde Pyelogram In OR    Result Date: 7/6/2024  FL RETROGRADE PYELOGRAM IN OR- 7/6/2024 4:44 PM  HISTORY: Stones; N20.1-Calculus of ureter; N39.0-Urinary tract infection, site not specified  COMPARISON: None  FLUOROSCOPY TIME: 0.9 seconds  FLUOROSCOPY DOSE: 6.8 mGy  NUMBER OF IMAGES: 3       Intraoperative fluoroscopic images during retrograde pyelogram and stent placement.  Please refer to the operative note for more details.  This report was signed and finalized on 7/6/2024 5:48 PM by Mark Anthony Terrazas.      CT Abdomen Pelvis With Contrast    Result Date: 7/6/2024  EXAMINATION: CT ABDOMEN PELVIS W CONTRAST-   7/6/2024 1:17 PM  HISTORY: Low abdominal pain.  In order to have a CT radiation dose as low as reasonably achievable Automated Exposure Control was utilized for adjustment of the mA and/or KV according to patient size.  CT Dose DLP = 314.28 mGy.cm. (If there are multiple studies performed at the same  time this represents the total dose).  Abdomen/pelvis CT with IV contrast injection. Axial, sagittal, and coronal sequences.  COMPARISON: 2024.  Moderate RIGHT hydronephrosis and dilation of the full length of the RIGHT ureter based on an obstructing 6 x 5 mm stone within the distal RIGHT ureter adjacent to the ureterovesical junction.  Normal heart size. Clear lung bases. Cholecystectomy clips. Normal liver, pancreas, spleen, adrenal glands, and LEFT kidney. Nonobstructing 5 mm stone within the midportion of the LEFT kidney.  No bowel dilation. Normal appearance of the uterus and urinary bladder.      1. Moderate RIGHT side obstruction based on a 6 x 5 mm stone at the RIGHT ureterovesical junction.  This report was signed and finalized on 2024 3:01 PM by Dr. Jhonathan South MD.      US Non-ob Transvaginal    Result Date: 2024  EXAMINATION: US NON-OB TRANSVAGINAL-  2024 12:32 PM  HISTORY: pelvic pain, lower abd pain  Grayscale, color flow, and Doppler transvaginal ultrasound.  Uterus = 118 x 41 x 61 mm.  Endometrium = 8 mm.  Ill-defined hypoechoic focus within the anterior wall of the lower uterine segment measures approximately 20 x 17 mm. This finding is not seen on the current CT exam. A low-density focus in this region on the previous CT exam (3 months ago) measured 20 x 15 mm. Low-density focus associated with a  scar is suspected. Possible adenomyosis or endometriosis.  Normal and symmetric ovaries. No free fluid.  RIGHT ovary = 35 x 27 mm. LEFT ovary = 32 x 14 x 19 mm.      1. No acute abnormality is seen.      This report was signed and finalized on 2024 2:59 PM by Dr. Jhonathan South MD.        Assessment and Plan   Obstructive pyelonephritis  Patient presented with obstructive pyelonephritis.  Treated operatively with adequate drainage and stone removal.  Plan:  -Continue IV Rocephin, await cultures    Sepsis  Secondary to problem #1.  Developed sepsis after drainage.  No blood  cultures were obtained in the ER unfortunately and she already received antibiotics but I did order blood cultures this morning.  Blood pressure improved after IV fluid bolus    ELIZABETH  Mild, continue aggressive IV fluids.    Disposition: Inpatient    CODE STATUS: Full    DVT prophylaxis: Lovenox      Ureteral stone    Sepsis without acute organ dysfunction    Obstructive pyelonephritis    ELIZABETH (acute kidney injury)      Can Godfrey MD 7/7/2024 09:07 CDT

## 2024-07-08 VITALS
BODY MASS INDEX: 22.18 KG/M2 | HEART RATE: 62 BPM | DIASTOLIC BLOOD PRESSURE: 71 MMHG | SYSTOLIC BLOOD PRESSURE: 110 MMHG | RESPIRATION RATE: 18 BRPM | WEIGHT: 138 LBS | TEMPERATURE: 98.3 F | OXYGEN SATURATION: 95 % | HEIGHT: 66 IN

## 2024-07-08 LAB — BACTERIA SPEC AEROBE CULT: ABNORMAL

## 2024-07-08 PROCEDURE — 25010000002 KETOROLAC TROMETHAMINE PER 15 MG: Performed by: UROLOGY

## 2024-07-08 PROCEDURE — G0378 HOSPITAL OBSERVATION PER HR: HCPCS

## 2024-07-08 PROCEDURE — 25010000002 CEFTRIAXONE PER 250 MG: Performed by: FAMILY MEDICINE

## 2024-07-08 PROCEDURE — 25810000003 LACTATED RINGERS PER 1000 ML: Performed by: FAMILY MEDICINE

## 2024-07-08 RX ORDER — CEFDINIR 300 MG/1
300 CAPSULE ORAL 2 TIMES DAILY
Qty: 28 CAPSULE | Refills: 0 | Status: SHIPPED | OUTPATIENT
Start: 2024-07-08 | End: 2024-07-22

## 2024-07-08 RX ADMIN — KETOROLAC TROMETHAMINE 15 MG: 30 INJECTION, SOLUTION INTRAMUSCULAR; INTRAVENOUS at 15:47

## 2024-07-08 RX ADMIN — SIMETHICONE 80 MG: 80 TABLET, CHEWABLE ORAL at 10:25

## 2024-07-08 RX ADMIN — HYDROCODONE BITARTRATE AND ACETAMINOPHEN 1 TABLET: 5; 325 TABLET ORAL at 11:41

## 2024-07-08 RX ADMIN — SODIUM CHLORIDE, POTASSIUM CHLORIDE, SODIUM LACTATE AND CALCIUM CHLORIDE 150 ML/HR: 600; 310; 30; 20 INJECTION, SOLUTION INTRAVENOUS at 03:40

## 2024-07-08 RX ADMIN — KETOROLAC TROMETHAMINE 15 MG: 30 INJECTION, SOLUTION INTRAMUSCULAR; INTRAVENOUS at 10:25

## 2024-07-08 RX ADMIN — HYDROCODONE BITARTRATE AND ACETAMINOPHEN 1 TABLET: 5; 325 TABLET ORAL at 06:01

## 2024-07-08 RX ADMIN — OXYBUTYNIN CHLORIDE 5 MG: 5 TABLET, EXTENDED RELEASE ORAL at 08:38

## 2024-07-08 RX ADMIN — CEFTRIAXONE SODIUM 2000 MG: 2 INJECTION, POWDER, FOR SOLUTION INTRAMUSCULAR; INTRAVENOUS at 15:48

## 2024-07-08 RX ADMIN — TAMSULOSIN HYDROCHLORIDE 0.4 MG: 0.4 CAPSULE ORAL at 08:38

## 2024-07-08 NOTE — PLAN OF CARE
Admitted 7/6 fever (102.1 oral), R flanks plain (6 mm R ureteral stone), uti (4+ bacteria, 3+ leukocytes, + nitrites); Rocephin 2 gm daily  Urology consulted; R ureteroscoy, R retrograde hyelogram, R ureteral balloon dilation, R basket extraction of stone, R ureteral stent placed on 7/6; recommending 2 wks of abx and follow up in 2 wks  Oxybutinin and flomax started 7/7  Blood cultures done 7/7 7/8 KUB negative  SCDs  Problem: Adult Inpatient Plan of Care  Goal: Plan of Care Review  Outcome: Ongoing, Progressing  Flowsheets (Taken 7/8/2024 0308)  Plan of Care Reviewed With: patient     Problem: Fluid and Electrolyte Imbalance (Surgery Nonspecified)  Goal: Fluid and Electrolyte Balance  Outcome: Ongoing, Progressing     Problem: Infection (Surgery Nonspecified)  Goal: Absence of Infection Signs and Symptoms  Outcome: Ongoing, Progressing     Problem: Infection Progression (Sepsis/Septic Shock)  Goal: Absence of Infection Signs and Symptoms  Outcome: Ongoing, Progressing  Intervention: Promote Recovery  Recent Flowsheet Documentation  Taken 7/7/2024 2336 by Jovi Max RN  Activity Management:   ambulated to bathroom   back to bed     Problem: UTI (Urinary Tract Infection)  Goal: Improved Infection Symptoms  Outcome: Ongoing, Progressing     Problem: Adult Inpatient Plan of Care  Goal: Absence of Hospital-Acquired Illness or Injury  Intervention: Identify and Manage Fall Risk  Recent Flowsheet Documentation  Taken 7/8/2024 0200 by Jovi Max RN  Safety Promotion/Fall Prevention: safety round/check completed  Taken 7/8/2024 0000 by Jovi Max RN  Safety Promotion/Fall Prevention: safety round/check completed  Taken 7/7/2024 2300 by Jovi Max RN  Safety Promotion/Fall Prevention: safety round/check completed  Taken 7/7/2024 2211 by Jovi Max, RN  Safety Promotion/Fall Prevention: safety round/check completed  Taken 7/7/2024 2100 by Jovi Max, RN  Safety Promotion/Fall Prevention: safety round/check  completed  Taken 7/7/2024 2000 by Jovi Max RN  Safety Promotion/Fall Prevention: safety round/check completed  Taken 7/7/2024 1910 by Jovi Max RN  Safety Promotion/Fall Prevention: safety round/check completed  Intervention: Prevent Skin Injury  Recent Flowsheet Documentation  Taken 7/7/2024 2000 by Jovi Max RN  Body Position: position changed independently  Intervention: Prevent and Manage VTE (Venous Thromboembolism) Risk  Recent Flowsheet Documentation  Taken 7/7/2024 2336 by Jovi Max RN  Activity Management:   ambulated to bathroom   back to bed  Taken 7/7/2024 2000 by Jovi Max RN  VTE Prevention/Management:   bilateral   sequential compression devices off  Goal: Optimal Comfort and Wellbeing  Intervention: Monitor Pain and Promote Comfort  Recent Flowsheet Documentation  Taken 7/7/2024 2211 by Jovi Max RN  Pain Management Interventions: see MAR     Problem: Ongoing Anesthesia Effects (Surgery Nonspecified)  Goal: Anesthesia/Sedation Recovery  Intervention: Optimize Anesthesia Recovery  Recent Flowsheet Documentation  Taken 7/8/2024 0200 by Jovi Max RN  Safety Promotion/Fall Prevention: safety round/check completed  Taken 7/8/2024 0000 by Jovi Max RN  Safety Promotion/Fall Prevention: safety round/check completed  Taken 7/7/2024 2300 by Jovi Max RN  Safety Promotion/Fall Prevention: safety round/check completed  Taken 7/7/2024 2211 by Jovi Max RN  Safety Promotion/Fall Prevention: safety round/check completed  Taken 7/7/2024 2100 by Jovi Max RN  Safety Promotion/Fall Prevention: safety round/check completed  Taken 7/7/2024 2000 by Jovi Max RN  Safety Promotion/Fall Prevention: safety round/check completed  Taken 7/7/2024 1910 by Jovi Max RN  Safety Promotion/Fall Prevention: safety round/check completed     Problem: Pain (Surgery Nonspecified)  Goal: Acceptable Pain Control  Intervention: Prevent or Manage Pain  Recent Flowsheet Documentation  Taken 7/7/2024  2211 by Jovi Max, RN  Pain Management Interventions: see MAR  Taken 7/7/2024 2000 by Jovi Max, RN  Diversional Activities: television   Goal Outcome Evaluation:  Plan of Care Reviewed With: patient

## 2024-07-08 NOTE — PLAN OF CARE
Goal Outcome Evaluation:      US cancelled per Dr. Godfrey.  Urine less bloody throughout the day.  Up independent.  PRN pain medication as documented.  This evening with eating supper, patient complained of abdominal pain.  Abdomen firm and tenderness noted.  Hyperactive bowel sounds.  Bladder scan as documented.  Paged provider.  Stat KUB and simethicone ordered.  Pain residing with PRN's.  Vital signs stable.

## 2024-07-09 ENCOUNTER — TELEPHONE (OUTPATIENT)
Dept: UROLOGY | Facility: CLINIC | Age: 39
End: 2024-07-09
Payer: COMMERCIAL

## 2024-07-09 LAB
LAB AP CASE REPORT: NORMAL
Lab: NORMAL
PATH REPORT.FINAL DX SPEC: NORMAL
PATH REPORT.GROSS SPEC: NORMAL

## 2024-07-09 NOTE — TELEPHONE ENCOUNTER
Called pt. To check on them post-operatively. Pt. Denies any issues at this time. Advised pt. To call us with any questions or concerns. Also reminded pt of her scheduled follow up appt. Pt. V/u.

## 2024-07-09 NOTE — DISCHARGE SUMMARY
Hospital Discharge Summary    Luda Nguyen  :  1985  MRN:  [id number]    Admit date:  2024  Discharge date:  2024    Admitting Physician:    Anthony Millan MD    Discharge Diagnoses:      Ureteral stone    Sepsis without acute organ dysfunction    Obstructive pyelonephritis    ELIZABETH (acute kidney injury)      Hospital Course:       38-year-old female who presented with infected nephrolithiasis.  She underwent emergent operation with Dr. Cee who placed a ureteral stent with stone extraction.  She had subsequent improvement of her pain and infection.  She was also treated with antibiotics and urine culture ultimately grew E. coli which was sensitive to Rocephin and she was discharged on a 14-day course of cefdinir.    She will follow-up in clinic with myself in about 1 week and Dr. Cee in about 2 weeks with subsequent stent removal.      Discharge Medications:         Discharge Medications        New Medications        Instructions Start Date   cefdinir 300 MG capsule  Commonly known as: OMNICEF   300 mg, Oral, 2 Times Daily      HYDROcodone-acetaminophen 5-325 MG per tablet  Commonly known as: NORCO   1 tablet, Oral, Every 6 Hours PRN      ketorolac 10 MG tablet  Commonly known as: TORADOL   10 mg, Oral, Every 6 Hours PRN      naloxone 4 MG/0.1ML nasal spray  Commonly known as: NARCAN   Call 911. Don't prime. Ackley in 1 nostril for overdose. Repeat in 2-3 minutes in other nostril if no or minimal breathing/responsiveness.      oxybutynin XL 5 MG 24 hr tablet  Commonly known as: DITROPAN-XL   5 mg, Oral, Daily      tamsulosin 0.4 MG capsule 24 hr capsule  Commonly known as: FLOMAX   0.4 mg, Oral, Nightly             Continue These Medications        Instructions Start Date   Airsupra 90-80 MCG/ACT aerosol  Generic drug: Albuterol-Budesonide   80 mcg, Inhalation, 2 Times Daily      iron polysacch complex-B12--0.025-1 MG capsule capsule  Commonly known as: FERREX 150 FORTE   1 capsule,  Oral, Daily      LORazepam 1 MG tablet  Commonly known as: ATIVAN   1 mg, Oral, Daily PRN      montelukast 10 MG tablet  Commonly known as: SINGULAIR   10 mg, Oral, Nightly      multivitamin with minerals tablet tablet   1 tablet, Oral, Daily      Myf[place]ree 40-1-0.5 MG tablet  Generic drug: Relugolix-Estradiol-Norethind   1 tablet, Oral, Daily      traZODone 50 MG tablet  Commonly known as: DESYREL   50 mg, Oral, Nightly PRN               Consults:     Significant Diagnostic Studies:      XR Abdomen KUB    Result Date: 7/7/2024   No visible radiopaque renal calculi.  Right double-J stent.      This report was signed and finalized on 7/7/2024 7:26 PM by Mark Anthony Terrazas.      FL Retrograde Pyelogram In OR    Result Date: 7/6/2024   Intraoperative fluoroscopic images during retrograde pyelogram and stent placement.  Please refer to the operative note for more details.  This report was signed and finalized on 7/6/2024 5:48 PM by Mark Anthony Terrazas.      CT Abdomen Pelvis With Contrast    Result Date: 7/6/2024  1. Moderate RIGHT side obstruction based on a 6 x 5 mm stone at the RIGHT ureterovesical junction.  This report was signed and finalized on 7/6/2024 3:01 PM by Dr. Jhonathan South MD.      US Non-ob Transvaginal    Result Date: 7/6/2024  1. No acute abnormality is seen.      This report was signed and finalized on 7/6/2024 2:59 PM by Dr. Jhonathan South MD.      US Pelvis Complete    Result Date: 7/6/2024  1. No acute abnormality is seen.      This report was signed and finalized on 7/6/2024 2:59 PM by Dr. Jhonathan South MD.      US Testicular or Ovarian Vascular Complete    Result Date: 7/6/2024  1. No acute abnormality is seen.      This report was signed and finalized on 7/6/2024 2:59 PM by Dr. Jhonathan South MD.          Disposition:   Home  Follow up with Anthony Millan MD in 1 weeks.    Signed:  Anthony Millan MD   7/9/2024, 07:33 CDT

## 2024-07-12 LAB
BACTERIA SPEC AEROBE CULT: NORMAL
BACTERIA SPEC AEROBE CULT: NORMAL

## 2024-07-22 ENCOUNTER — PROCEDURE VISIT (OUTPATIENT)
Dept: UROLOGY | Facility: CLINIC | Age: 39
End: 2024-07-22
Payer: COMMERCIAL

## 2024-07-22 DIAGNOSIS — N20.1 URETERAL STONE: Primary | ICD-10-CM

## 2024-07-22 PROCEDURE — 52310 CYSTOSCOPY AND TREATMENT: CPT | Performed by: UROLOGY

## 2024-07-22 NOTE — PROGRESS NOTES
Cystoscopy with stent removal    Indications: Status post ureteroscopy    Prep: Hibiclens solution    Instrumentation:Flexible cystoscope and Alligator grasping forceps    Procedure: After lidocaine jelly is instilled into the urethra for 10 minutes, the well-lubricated cystoscope was introduced through the urethra into the bladder.  The stent is seen protruding from the right side.  The alligator forceps or used to grasp the stent and pull it out the urethra.  The patient tolerated the procedure well.

## 2024-07-31 ENCOUNTER — OFFICE VISIT (OUTPATIENT)
Age: 39
End: 2024-07-31
Payer: COMMERCIAL

## 2024-07-31 VITALS
DIASTOLIC BLOOD PRESSURE: 96 MMHG | WEIGHT: 134 LBS | SYSTOLIC BLOOD PRESSURE: 146 MMHG | HEIGHT: 66 IN | BODY MASS INDEX: 21.53 KG/M2

## 2024-07-31 DIAGNOSIS — Z78.9 NON-SMOKER: ICD-10-CM

## 2024-07-31 DIAGNOSIS — N80.9 ENDOMETRIOSIS: ICD-10-CM

## 2024-07-31 DIAGNOSIS — D25.9 UTERINE LEIOMYOMA, UNSPECIFIED LOCATION: ICD-10-CM

## 2024-07-31 DIAGNOSIS — N94.6 DYSMENORRHEA: ICD-10-CM

## 2024-07-31 DIAGNOSIS — N94.10 DYSPAREUNIA IN FEMALE: ICD-10-CM

## 2024-07-31 DIAGNOSIS — R10.2 PELVIC PAIN: ICD-10-CM

## 2024-07-31 DIAGNOSIS — N92.0 MENORRHAGIA WITH REGULAR CYCLE: Primary | ICD-10-CM

## 2024-07-31 PROCEDURE — 99213 OFFICE O/P EST LOW 20 MIN: CPT | Performed by: OBSTETRICS & GYNECOLOGY

## 2024-07-31 RX ORDER — RELUGOLIX, ESTRADIOL HEMIHYDRATE, AND NORETHINDRONE ACETATE 40; 1; .5 MG/1; MG/1; MG/1
1 TABLET, FILM COATED ORAL DAILY
Qty: 30 TABLET | Refills: 6 | Status: SHIPPED | OUTPATIENT
Start: 2024-07-31

## 2024-07-31 RX ORDER — NICOTINE 14MG/24HR
PATCH, TRANSDERMAL 24 HOURS TRANSDERMAL
COMMUNITY

## 2024-07-31 RX ORDER — RELUGOLIX, ESTRADIOL HEMIHYDRATE, AND NORETHINDRONE ACETATE 40; 1; .5 MG/1; MG/1; MG/1
40 TABLET, FILM COATED ORAL DAILY
Qty: 14 TABLET | Refills: 0 | COMMUNITY
Start: 2024-07-31

## 2024-07-31 NOTE — PROGRESS NOTES
"    Chief Complaint  Menorrhagia (Pt here for 3mo f/u on menorrhagia after doing Myfembree and reports she has not had a period while taking it, she does report a kidney stone 3wks ago so any pain was more than likely due to that, had u/s done in office today)    Subjective        Luda Nguyen presents to [facility]  History of Present Illness    Patient presents today for follow-up  She has had complete resolution of her symptoms with Myfembree  She wishes to continue    Ultrasound is repeated and is reviewed  The fibroid appears to be quite a bit smaller on ultrasound today    Objective   Vital Signs:  /96 (BP Location: Left arm, Patient Position: Sitting, Cuff Size: Adult)   Ht 167.6 cm (66\")   Wt 60.8 kg (134 lb)   BMI 21.63 kg/m²   Estimated body mass index is 21.63 kg/m² as calculated from the following:    Height as of this encounter: 167.6 cm (66\").    Weight as of this encounter: 60.8 kg (134 lb).       BMI is within normal parameters. No other follow-up for BMI required.      Physical Exam  Constitutional:       General: She is not in acute distress.     Appearance: Normal appearance. She is not toxic-appearing.   HENT:      Head: Normocephalic and atraumatic.      Nose: Nose normal.   Neurological:      General: No focal deficit present.      Mental Status: She is alert.   Psychiatric:         Mood and Affect: Mood normal.         Behavior: Behavior normal.         Thought Content: Thought content normal.         Judgment: Judgment normal.        Result Review :                     Assessment and Plan     Diagnoses and all orders for this visit:    1. Menorrhagia with regular cycle (Primary)  -     Relugolix-Estradiol-Norethind (Myfembree) 40-1-0.5 MG tablet; Take 1 tablet by mouth Daily.  Dispense: 30 tablet; Refill: 6    2. Pelvic pain  -     Relugolix-Estradiol-Norethind (Myfembree) 40-1-0.5 MG tablet; Take 1 tablet by mouth Daily.  Dispense: 30 tablet; Refill: 6    3. " Dyspareunia in female    4. Dysmenorrhea  -     Relugolix-Estradiol-Norethind (Myfembree) 40-1-0.5 MG tablet; Take 1 tablet by mouth Daily.  Dispense: 30 tablet; Refill: 6    5. Uterine leiomyoma, unspecified location  -     Relugolix-Estradiol-Norethind (Myfembree) 40-1-0.5 MG tablet; Take 1 tablet by mouth Daily.  Dispense: 30 tablet; Refill: 6    6. Endometriosis  -     Relugolix-Estradiol-Norethind (Myfembree) 40-1-0.5 MG tablet; Take 1 tablet by mouth Daily.  Dispense: 30 tablet; Refill: 6    7. Non-smoker             Follow Up     Return in about 6 months (around 1/31/2025) for Annual physical, With Gyn U/S, with me.  Patient was given instructions and counseling regarding her condition or for health maintenance advice. Please see specific information pulled into the AVS if appropriate.     Continue Myfembree  Call for concerns or questions    August Márquez MD

## 2024-08-22 NOTE — PROGRESS NOTES
Subjective    Ms. Nguyen is 38 y.o. female    Chief Complaint: Ureteral Stone    History of Present Illness  Patient is status post right ureteroscopy right ureteral stent for distal ureteral stone on the right.  She presented with obstructive pyelonephritis and sepsis.  No previous history of ureteral stone disease.  Stone analysis consistent with calcium oxalate monohydrate and dihydrate as well as a calcium phosphate. Denies post operative issues.    The following portions of the patient's history were reviewed and updated as appropriate: allergies, current medications, past family history, past medical history, past social history, past surgical history and problem list.    Review of Systems      Current Outpatient Medications:     Albuterol-Budesonide (Airsupra) 90-80 MCG/ACT aerosol, Inhale 80 mcg 2 (Two) Times a Day., Disp: , Rfl:     ketorolac (TORADOL) 10 MG tablet, Take 1 tablet by mouth Every 6 (Six) Hours As Needed for Moderate Pain., Disp: 12 tablet, Rfl: 0    LORazepam (ATIVAN) 1 MG tablet, Take 1 tablet by mouth Daily As Needed for Anxiety., Disp: , Rfl:     montelukast (SINGULAIR) 10 MG tablet, Take 1 tablet by mouth Every Night., Disp: , Rfl:     Multiple Vitamins-Minerals (MULTIVITAMIN ADULT PO), Take 1 tablet by mouth Daily., Disp: , Rfl:     Relugolix-Estradiol-Norethind (Myfembree) 40-1-0.5 MG tablet, Take 1 tablet by mouth Daily., Disp: 30 tablet, Rfl: 6    Relugolix-Estradiol-Norethind (Myfembree) 40-1-0.5 MG tablet, Take 40 mg by mouth Daily., Disp: 14 tablet, Rfl: 0    Saccharomyces boulardii (Probiotic) 250 MG capsule, Take  by mouth., Disp: , Rfl:     traZODone (DESYREL) 50 MG tablet, Take 1 tablet by mouth At Night As Needed for Sleep., Disp: , Rfl:     Past Medical History:   Diagnosis Date    Anxiety     Endometriosis     Insomnia     Patient denies medical problems     PONV (postoperative nausea and vomiting)     Spinal headache        Past Surgical History:   Procedure Laterality Date      SECTION       SECTION N/A 2017    Procedure:  SECTION REPEAT;  Surgeon: Kelly Meléndez DO;  Location: [facility] LABOR DELIVERY;  Service:     CHOLECYSTECTOMY WITH INTRAOPERATIVE CHOLANGIOGRAM N/A 2022    Procedure: CHOLECYSTECTOMY LAPAROSCOPIC WITH  PRE-OPERATIVE ICG INJECTION;  Surgeon: Coco Garnica MD;  Location: [facility] OR;  Service: General;  Laterality: N/A;    CYSTOSCOPY URETEROSCOPY LASER LITHOTRIPSY Right 2024    Procedure: RIGHT URETEROSCOPY, RIGHT RETROGRADE PYELOGRAM, RIGHT URETERAL BALLOON DILATION, RIGHT BASKET EXTRACTION OF STONE, RIGHT URETERAL STENT;  Surgeon: Feroz Cee MD;  Location: [facility] OR;  Service: Urology;  Laterality: Right;    FOOT SURGERY      left    WISDOM TOOTH EXTRACTION         Social History     Socioeconomic History    Marital status:    Tobacco Use    Smoking status: Never    Smokeless tobacco: Never   Vaping Use    Vaping status: Never Used   Substance and Sexual Activity    Alcohol use: Yes     Comment: occ    Drug use: No    Sexual activity: Yes     Partners: Male     Birth control/protection: Vasectomy     Comment: - vasectomy       Family History   Problem Relation Age of Onset    Uterine cancer Maternal Grandmother 70    Lung cancer Maternal Grandmother     Lung cancer Maternal Uncle 44       Objective    There were no vitals taken for this visit.    Physical Exam    Renal ultrasound independent review    The renal ultrasound is available for me to review.  Treatment recommendations require an independent review.  This film has been reviewed by the radiologist to determine any non urologic abnormalities that are presents.  However, I very closely inspected the kidneys for size, symmetry, contour, parenchymal thickness, perinephric reaction, presence of calcifications, and intrarenal dilation of the collecting system.       The right kidney appears normal on this ultrasound.  The renal  parenchymal is normal in thickness.  There are no solid masses or cysts.  There is no hydronephrosis.  There are no stones.      The left kidney appears normal on this ultrasound.  The renal parenchymal is normal in thickness.  There are no solid masses or cysts.  There is no hydronephrosis.  There are no stones.      The bladder appears normal on thisultrsaound.  The bladder appears normal in thickness.  There no masses or stones seen on this exam.           Results for orders placed or performed in visit on 09/04/24   POC Urinalysis Dipstick, Multipro    Specimen: Urine   Result Value Ref Range    Color Yellow Yellow, Straw, Dark Yellow, Natasha    Clarity, UA Clear Clear    Glucose, UA Negative Negative mg/dL    Bilirubin Negative Negative    Ketones, UA Negative Negative    Specific Gravity  1.010 1.005 - 1.030    Blood, UA Negative Negative    pH, Urine 6.0 5.0 - 8.0    Protein, POC Negative Negative mg/dL    Urobilinogen, UA 0.2 E.U./dL Normal, 0.2 E.U./dL    Nitrite, UA Negative Negative    Leukocytes Negative Negative     Assessment and Plan    Diagnoses and all orders for this visit:    1. Ureteral stone (Primary)  -     POC Urinalysis Dipstick, Multipro              After a discussion of the patient's current stone situation, I discussed conservative management to decrease risk of recurrent stones as follows:     -We recommend increasing the fluid intake so that a urine volume will be somewhere between 2-4 L/day.  To accomplish this will require a special effort to hydrate during the evening hours to produce nocturia.  This is probably the most challenging portion of hydrating to prevent stones.  It is recommended that you have two  8 ounce glasses of water between supper and bedtime.  Another 8 ounce glass should be consumed when you get up to go to the bathroom during the night.  It is explained that water hardness does not seem to predispose to stone formation and epidemiologic studies indicate the  incidence of stones is lower and hard water regions then in soft water regions.  With regard to other types of fluids it is best to avoid large amounts of tea, cocoa, cola drinks, and fruit juice, which contain significant amounts of oxalate and soluble form.  With adequate hydration you should notice .........   -The urine should remain colorless.   -Urine specific gravity should measure between 1.005-1.010 on any urinalysis.   -Direct volume measurement should be greater than 2 L.    - The controversy over calcium restriction is discussed especially in patients with calcium oxalate stones.  Moderation is recommended, but not stopping it.      - Protein restriction, sodium restriction, and restriction of oxalate-containing foods is also discussed.    A handout is provided in the patient's visit summary to remind them of this.     Follow up PRN.

## 2024-09-03 RX ORDER — RELUGOLIX, ESTRADIOL HEMIHYDRATE, AND NORETHINDRONE ACETATE 40; 1; .5 MG/1; MG/1; MG/1
1 TABLET, FILM COATED ORAL DAILY
Qty: 28 TABLET | Refills: 3 | OUTPATIENT
Start: 2024-09-03

## 2024-09-04 ENCOUNTER — HOSPITAL ENCOUNTER (OUTPATIENT)
Dept: ULTRASOUND IMAGING | Facility: HOSPITAL | Age: 39
Discharge: HOME | End: 2024-09-04
Admitting: UROLOGY
Payer: COMMERCIAL

## 2024-09-04 ENCOUNTER — OFFICE VISIT (OUTPATIENT)
Dept: UROLOGY | Facility: CLINIC | Age: 39
End: 2024-09-04
Payer: COMMERCIAL

## 2024-09-04 DIAGNOSIS — N20.1 URETERAL STONE: ICD-10-CM

## 2024-09-04 DIAGNOSIS — N20.1 URETERAL STONE: Primary | ICD-10-CM

## 2024-09-04 LAB
BILIRUB BLD-MCNC: NEGATIVE MG/DL
CLARITY, POC: CLEAR
COLOR UR: YELLOW
GLUCOSE UR STRIP-MCNC: NEGATIVE MG/DL
KETONES UR QL: NEGATIVE
LEUKOCYTE EST, POC: NEGATIVE
NITRITE UR-MCNC: NEGATIVE MG/ML
PH UR: 6 [PH] (ref 5–8)
PROT UR STRIP-MCNC: NEGATIVE MG/DL
RBC # UR STRIP: NEGATIVE /UL
SP GR UR: 1.01 (ref 1–1.03)
UROBILINOGEN UR QL: NORMAL

## 2024-09-04 PROCEDURE — 76775 US EXAM ABDO BACK WALL LIM: CPT

## 2025-01-20 ENCOUNTER — OFFICE VISIT (OUTPATIENT)
Age: 40
End: 2025-01-20
Payer: COMMERCIAL

## 2025-01-20 VITALS
HEIGHT: 66 IN | BODY MASS INDEX: 22.42 KG/M2 | WEIGHT: 139.5 LBS | SYSTOLIC BLOOD PRESSURE: 128 MMHG | DIASTOLIC BLOOD PRESSURE: 84 MMHG

## 2025-01-20 DIAGNOSIS — N80.9 ENDOMETRIOSIS: ICD-10-CM

## 2025-01-20 DIAGNOSIS — R10.2 PELVIC PAIN: ICD-10-CM

## 2025-01-20 DIAGNOSIS — Z12.31 SCREENING MAMMOGRAM FOR BREAST CANCER: ICD-10-CM

## 2025-01-20 DIAGNOSIS — Z01.419 WELL WOMAN EXAM WITH ROUTINE GYNECOLOGICAL EXAM: Primary | ICD-10-CM

## 2025-01-20 DIAGNOSIS — N92.0 MENORRHAGIA WITH REGULAR CYCLE: ICD-10-CM

## 2025-01-20 DIAGNOSIS — Z78.9 NON-SMOKER: ICD-10-CM

## 2025-01-20 DIAGNOSIS — Z12.4 ENCOUNTER FOR SCREENING FOR CERVICAL CANCER: ICD-10-CM

## 2025-01-20 DIAGNOSIS — N94.6 DYSMENORRHEA: ICD-10-CM

## 2025-01-20 DIAGNOSIS — D25.9 UTERINE LEIOMYOMA, UNSPECIFIED LOCATION: ICD-10-CM

## 2025-01-20 PROCEDURE — 87624 HPV HI-RISK TYP POOLED RSLT: CPT | Performed by: OBSTETRICS & GYNECOLOGY

## 2025-01-20 PROCEDURE — 87625 HPV TYPES 16 & 18 ONLY: CPT | Performed by: OBSTETRICS & GYNECOLOGY

## 2025-01-20 RX ORDER — FERROUS SULFATE 324(65)MG
324 TABLET, DELAYED RELEASE (ENTERIC COATED) ORAL
COMMUNITY

## 2025-01-20 RX ORDER — RELUGOLIX, ESTRADIOL HEMIHYDRATE, AND NORETHINDRONE ACETATE 40; 1; .5 MG/1; MG/1; MG/1
1 TABLET, FILM COATED ORAL DAILY
Qty: 30 TABLET | Refills: 11 | Status: SHIPPED | OUTPATIENT
Start: 2025-01-20

## 2025-01-20 NOTE — PROGRESS NOTES
Chief Complaint  Gynecologic Exam (Pt here for annual and 6mo on Myfembree and is 100% better on it she does not have a period but does report some spotting, last pap 01/18/2022 ASCUS/-HPV )    Subjective        Luda Nguyen presents to [facility]  History of Present Illness    History of Present Illness  The patient is a 39-year-old female here for follow-up on menorrhagia from a uterine fibroid as well as for her yearly exam.    She reports that Myfembree has effectively ceased her menstrual cycles, with only occasional spotting occurring sporadically. She experienced a 2-week period of light spotting, which was an isolated incident and not indicative of a regular pattern. The spotting is so minimal that it does not necessitate the use of sanitary products and is only noticeable during bathroom visits. She has been on Myfembree for approximately 6 months and finds the occasional spotting more tolerable than the previous heavy and painful periods. The spotting is not associated with any pain.    Her last laboratory tests were conducted in July during a hospital admission for sepsis and a kidney stone. She has observed an elevated heart rate of 180 during peak physical exertion since her sepsis diagnosis. This increase in heart rate is consistent across various activities, although it does not reach the same level during other forms of exercise. She engages in sprinting exercises once a week, which leave her feeling fatigued.    Supplemental Information  She sees her primary care physician, Dr. Akira Millan, when she is sick. She was also seeing him for her Ativan prescription, which she takes as needed. Initially, he wanted to see her every 3 months, but she does not take it frequently now. She sees a PA in his office.    SOCIAL HISTORY  She works in a middle school.    MEDICATIONS  Current: Myfembree, Ativan (as needed)    Objective   Vital Signs:  /84 (BP Location: Left arm, Patient  "Position: Sitting, Cuff Size: Adult)   Ht 167.6 cm (66\")   Wt 63.3 kg (139 lb 8 oz)   BMI 22.52 kg/m²   Estimated body mass index is 22.52 kg/m² as calculated from the following:    Height as of this encounter: 167.6 cm (66\").    Weight as of this encounter: 63.3 kg (139 lb 8 oz).    BMI is within normal parameters. No other follow-up for BMI required.      Physical Exam  Vitals and nursing note reviewed. Exam conducted with a chaperone present.   Constitutional:       Appearance: Normal appearance.   HENT:      Head: Normocephalic and atraumatic.      Mouth/Throat:      Mouth: Mucous membranes are moist.   Eyes:      Extraocular Movements: Extraocular movements intact.      Pupils: Pupils are equal, round, and reactive to light.   Neck:      Vascular: No carotid bruit.   Cardiovascular:      Rate and Rhythm: Normal rate and regular rhythm.      Pulses: Normal pulses.      Heart sounds: Normal heart sounds. No murmur heard.     No friction rub. No gallop.   Pulmonary:      Effort: Pulmonary effort is normal. No respiratory distress.      Breath sounds: Normal breath sounds. No stridor. No wheezing, rhonchi or rales.   Chest:      Chest wall: No tenderness.   Breasts:     Breasts are symmetrical.      Right: Normal. No swelling, bleeding, inverted nipple, mass, nipple discharge, skin change or tenderness.      Left: Normal. No swelling, bleeding, inverted nipple, mass, nipple discharge, skin change or tenderness.   Abdominal:      General: Abdomen is flat. Bowel sounds are normal. There is no distension.      Palpations: Abdomen is soft. There is no mass.      Tenderness: There is no abdominal tenderness. There is no right CVA tenderness, left CVA tenderness, guarding or rebound.      Hernia: No hernia is present. There is no hernia in the left inguinal area or right inguinal area.   Genitourinary:     General: Normal vulva.      Exam position: Lithotomy position.      Pubic Area: No rash or pubic lice.       " Labia:         Right: No rash, tenderness, lesion or injury.         Left: No rash, tenderness, lesion or injury.       Urethra: No prolapse, urethral pain, urethral swelling or urethral lesion.      Vagina: Normal.      Cervix: Normal.      Uterus: Normal. Not deviated, not enlarged, not fixed, not tender and no uterine prolapse.       Adnexa: Right adnexa normal and left adnexa normal.        Right: No mass, tenderness or fullness.          Left: No mass, tenderness or fullness.     Musculoskeletal:         General: Normal range of motion.      Cervical back: Normal range of motion and neck supple. No rigidity. No muscular tenderness.   Lymphadenopathy:      Cervical: No cervical adenopathy.      Upper Body:      Right upper body: No supraclavicular, axillary or pectoral adenopathy.      Left upper body: No supraclavicular, axillary or pectoral adenopathy.      Lower Body: No right inguinal adenopathy. No left inguinal adenopathy.   Skin:     General: Skin is warm and dry.   Neurological:      General: No focal deficit present.      Mental Status: She is alert and oriented to person, place, and time. Mental status is at baseline.   Psychiatric:         Mood and Affect: Mood normal.         Behavior: Behavior normal.         Thought Content: Thought content normal.         Judgment: Judgment normal.        Result Review :                Assessment and Plan   Diagnoses and all orders for this visit:    1. Well woman exam with routine gynecological exam (Primary)  -     CBC & Differential  -     Comprehensive Metabolic Panel  -     Hemoglobin A1c  -     Lipid Panel  -     T3, Uptake  -     T4, Free  -     TSH  -     Vitamin D,25-Hydroxy    2. Encounter for screening for cervical cancer  -     Liquid-based Pap Smear, Screening    3. Screening mammogram for breast cancer  -     Mammo Screening Digital Tomosynthesis Bilateral With CAD    4. Non-smoker    5. Menorrhagia with regular cycle  -      Relugolix-Estradiol-Norethind (Myfembree) 40-1-0.5 MG tablet; Take 1 tablet by mouth Daily.  Dispense: 30 tablet; Refill: 11    6. Pelvic pain  -     Relugolix-Estradiol-Norethind (Myfembree) 40-1-0.5 MG tablet; Take 1 tablet by mouth Daily.  Dispense: 30 tablet; Refill: 11    7. Dysmenorrhea  -     Relugolix-Estradiol-Norethind (Myfembree) 40-1-0.5 MG tablet; Take 1 tablet by mouth Daily.  Dispense: 30 tablet; Refill: 11    8. Uterine leiomyoma, unspecified location  -     Relugolix-Estradiol-Norethind (Myfembree) 40-1-0.5 MG tablet; Take 1 tablet by mouth Daily.  Dispense: 30 tablet; Refill: 11    9. Endometriosis  -     Relugolix-Estradiol-Norethind (Myfembree) 40-1-0.5 MG tablet; Take 1 tablet by mouth Daily.  Dispense: 30 tablet; Refill: 11        Assessment & Plan  1. Menorrhagia.  She has been experiencing significant improvement in her symptoms since starting Myfembree 6 months ago. She reports random spotting, with a recent episode lasting 2 weeks, but no full periods. The spotting is very light and not painful. She will continue Myfembree, and a refill has been sent to [facility].    2. Health maintenance.  A mammogram has been ordered as it is due this year. A Pap smear will be performed during this visit. Laboratory tests, including a cholesterol panel, will be conducted today. If her cholesterol levels are elevated, she will be referred to her primary care physician, Dr. Akira Millan, for further management.         Follow Up   Return in about 1 year (around 1/20/2026) for Annual physical, with me.  Patient was given instructions and counseling regarding her condition or for health maintenance advice. Please see specific information pulled into the AVS if appropriate.         August Márquez MD    Patient or patient representative verbalized consent for the use of Ambient Listening during the visit with  August Márquez MD for chart documentation. 1/20/2025  15:24 CST

## 2025-01-21 LAB
25(OH)D3+25(OH)D2 SERPL-MCNC: 42.6 NG/ML (ref 30–100)
ALBUMIN SERPL-MCNC: 4.8 G/DL (ref 3.9–4.9)
ALP SERPL-CCNC: 58 IU/L (ref 44–121)
ALT SERPL-CCNC: 22 IU/L (ref 0–32)
AST SERPL-CCNC: 26 IU/L (ref 0–40)
BASOPHILS # BLD AUTO: 0 X10E3/UL (ref 0–0.2)
BASOPHILS NFR BLD AUTO: 1 %
BILIRUB SERPL-MCNC: <0.2 MG/DL (ref 0–1.2)
BUN SERPL-MCNC: 17 MG/DL (ref 6–20)
BUN/CREAT SERPL: 19 (ref 9–23)
CALCIUM SERPL-MCNC: 9.8 MG/DL (ref 8.7–10.2)
CHLORIDE SERPL-SCNC: 101 MMOL/L (ref 96–106)
CHOLEST SERPL-MCNC: 164 MG/DL (ref 100–199)
CO2 SERPL-SCNC: 24 MMOL/L (ref 20–29)
CREAT SERPL-MCNC: 0.91 MG/DL (ref 0.57–1)
EGFRCR SERPLBLD CKD-EPI 2021: 82 ML/MIN/1.73
EOSINOPHIL # BLD AUTO: 0.1 X10E3/UL (ref 0–0.4)
EOSINOPHIL NFR BLD AUTO: 1 %
ERYTHROCYTE [DISTWIDTH] IN BLOOD BY AUTOMATED COUNT: 12.2 % (ref 11.7–15.4)
GLOBULIN SER CALC-MCNC: 2.2 G/DL (ref 1.5–4.5)
GLUCOSE SERPL-MCNC: 91 MG/DL (ref 70–99)
HBA1C MFR BLD: 5.6 % (ref 4.8–5.6)
HCT VFR BLD AUTO: 40 % (ref 34–46.6)
HDLC SERPL-MCNC: 107 MG/DL
HGB BLD-MCNC: 13.4 G/DL (ref 11.1–15.9)
IMM GRANULOCYTES # BLD AUTO: 0 X10E3/UL (ref 0–0.1)
IMM GRANULOCYTES NFR BLD AUTO: 1 %
LDLC SERPL CALC-MCNC: 43 MG/DL (ref 0–99)
LYMPHOCYTES # BLD AUTO: 2.3 X10E3/UL (ref 0.7–3.1)
LYMPHOCYTES NFR BLD AUTO: 36 %
MCH RBC QN AUTO: 30.9 PG (ref 26.6–33)
MCHC RBC AUTO-ENTMCNC: 33.5 G/DL (ref 31.5–35.7)
MCV RBC AUTO: 92 FL (ref 79–97)
MONOCYTES # BLD AUTO: 0.5 X10E3/UL (ref 0.1–0.9)
MONOCYTES NFR BLD AUTO: 7 %
NEUTROPHILS # BLD AUTO: 3.6 X10E3/UL (ref 1.4–7)
NEUTROPHILS NFR BLD AUTO: 54 %
PLATELET # BLD AUTO: 304 X10E3/UL (ref 150–450)
POTASSIUM SERPL-SCNC: 4 MMOL/L (ref 3.5–5.2)
PROT SERPL-MCNC: 7 G/DL (ref 6–8.5)
RBC # BLD AUTO: 4.33 X10E6/UL (ref 3.77–5.28)
SODIUM SERPL-SCNC: 140 MMOL/L (ref 134–144)
T3RU NFR SERPL: 32 % (ref 24–39)
T4 FREE SERPL-MCNC: 1.21 NG/DL (ref 0.82–1.77)
TRIGL SERPL-MCNC: 71 MG/DL (ref 0–149)
TSH SERPL DL<=0.005 MIU/L-ACNC: 1.67 UIU/ML (ref 0.45–4.5)
VLDLC SERPL CALC-MCNC: 14 MG/DL (ref 5–40)
WBC # BLD AUTO: 6.5 X10E3/UL (ref 3.4–10.8)

## 2025-01-27 LAB
GEN CATEG CVX/VAG CYTO-IMP: ABNORMAL
HPV I/H RISK 4 DNA CVX QL PROBE+SIG AMP: DETECTED
HPV16 DNA SPEC QL NAA+PROBE: NORMAL
HPV18+45 E6+E7 MRNA CVX QL NAA+PROBE: NORMAL
LAB AP CASE REPORT: ABNORMAL
LAB AP GYN ADDITIONAL INFORMATION: ABNORMAL
LAB AP GYN OTHER FINDINGS: ABNORMAL
Lab: ABNORMAL
NCCN CRITERIA FLAG: NORMAL
PATH INTERP SPEC-IMP: ABNORMAL
STAT OF ADQ CVX/VAG CYTO-IMP: ABNORMAL
TYRER CUZICK SCORE: 11.5

## 2025-01-28 ENCOUNTER — TELEPHONE (OUTPATIENT)
Dept: OBSTETRICS AND GYNECOLOGY | Age: 40
End: 2025-01-28
Payer: COMMERCIAL

## 2025-01-28 ENCOUNTER — HOSPITAL ENCOUNTER (OUTPATIENT)
Dept: MAMMOGRAPHY | Facility: HOSPITAL | Age: 40
Discharge: HOME | End: 2025-01-28
Admitting: OBSTETRICS & GYNECOLOGY
Payer: COMMERCIAL

## 2025-01-28 PROCEDURE — 77063 BREAST TOMOSYNTHESIS BI: CPT

## 2025-01-28 PROCEDURE — 77067 SCR MAMMO BI INCL CAD: CPT

## 2025-01-28 NOTE — TELEPHONE ENCOUNTER
Caller: Luda Nguyen    Relationship to patient: Self    Best call back number: [phone] (home)       Patient is needing: PT IS RETURNING A CALL FROM YESTERDAY. SHE THINKS IT MAY HAVE BEEN FAUZIA.

## 2025-02-11 ENCOUNTER — OFFICE VISIT (OUTPATIENT)
Age: 40
End: 2025-02-11
Payer: COMMERCIAL

## 2025-02-11 VITALS
HEIGHT: 66 IN | BODY MASS INDEX: 22.82 KG/M2 | SYSTOLIC BLOOD PRESSURE: 144 MMHG | WEIGHT: 142 LBS | DIASTOLIC BLOOD PRESSURE: 92 MMHG

## 2025-02-11 DIAGNOSIS — Z78.9 NON-SMOKER: ICD-10-CM

## 2025-02-11 DIAGNOSIS — R87.810 CERVICAL HIGH RISK HPV (HUMAN PAPILLOMAVIRUS) TEST POSITIVE: ICD-10-CM

## 2025-02-11 DIAGNOSIS — R87.612 LOW GRADE SQUAMOUS INTRAEPITHELIAL LESION (LGSIL) ON PAPANICOLAOU SMEAR OF CERVIX: Primary | ICD-10-CM

## 2025-02-11 NOTE — PROGRESS NOTES
Colposcopy    Date of procedure:  2/11/2025    Risks and benefits discussed? yes  All questions answered? yes  Consents given by the patient  Written consent obtained? yes    Local anesthesia used:  no    Pre-op indication: LGSIL - mild dysplasia  Procedure documentation:    The cervix was initially viewed colposcopically through a green filter.  The cervix was next bathed in acetic acid.   The findings were as follows:      The transformation zone was able to be seen adequately.    No visible lesions    Ectocervical biopsies were not performed    An ECC was not performed.    Patient tolerated the procedure well.      Colposcopic Impression: Adequate colposcopy  Colposcopic findings are consistent with PAP       Plan: Repeat pap in one year      This note was electronically signed.    August Márquez MD  February 11, 2025

## 2025-06-12 ENCOUNTER — TELEPHONE (OUTPATIENT)
Age: 40
End: 2025-06-12
Payer: COMMERCIAL

## 2025-06-12 NOTE — TELEPHONE ENCOUNTER
Called and spoke to pt and informed her that there are different types of hair loss and Dr. Cid had not seen this as a significant side effect.  I informed her he said it depends on the pattern of the hair loss she is experiencing and may be better to have her see someone to look into it a bit prior to discontinuing completely as it is providing good relief.  I offered to make a f/u appt with her in our office, pt stated she had seen her PCP recently and he wanted to do some labs but had not made it in to do these so she would do these there and let us know the results, pt voiced understanding.

## 2025-08-15 ENCOUNTER — HOSPITAL ENCOUNTER (EMERGENCY)
Facility: HOSPITAL | Age: 40
Discharge: HOME | End: 2025-08-15
Attending: FAMILY MEDICINE
Payer: COMMERCIAL

## 2025-08-15 ENCOUNTER — APPOINTMENT (OUTPATIENT)
Dept: GENERAL RADIOLOGY | Facility: HOSPITAL | Age: 40
End: 2025-08-15
Payer: COMMERCIAL

## 2025-08-15 ENCOUNTER — APPOINTMENT (OUTPATIENT)
Dept: CARDIOLOGY | Facility: HOSPITAL | Age: 40
End: 2025-08-15
Payer: COMMERCIAL

## 2025-08-15 ENCOUNTER — APPOINTMENT (OUTPATIENT)
Dept: CT IMAGING | Facility: HOSPITAL | Age: 40
End: 2025-08-15
Payer: COMMERCIAL

## 2025-08-15 VITALS
OXYGEN SATURATION: 99 % | WEIGHT: 146 LBS | RESPIRATION RATE: 16 BRPM | SYSTOLIC BLOOD PRESSURE: 126 MMHG | TEMPERATURE: 98.1 F | HEIGHT: 66 IN | BODY MASS INDEX: 23.46 KG/M2 | HEART RATE: 62 BPM | DIASTOLIC BLOOD PRESSURE: 82 MMHG

## 2025-08-15 DIAGNOSIS — M50.322 OTHER CERVICAL DISC DEGENERATION AT C5-C6 LEVEL: ICD-10-CM

## 2025-08-15 DIAGNOSIS — M50.323 OTHER CERVICAL DISC DEGENERATION AT C6-C7 LEVEL: ICD-10-CM

## 2025-08-15 DIAGNOSIS — R07.89 ATYPICAL CHEST PAIN: ICD-10-CM

## 2025-08-15 DIAGNOSIS — R00.2 PALPITATIONS: Primary | ICD-10-CM

## 2025-08-15 DIAGNOSIS — R07.9 CHEST PAIN, UNSPECIFIED: ICD-10-CM

## 2025-08-15 DIAGNOSIS — M54.12 CERVICAL RADICULOPATHY: ICD-10-CM

## 2025-08-15 LAB
ALBUMIN SERPL-MCNC: 4.9 G/DL (ref 3.5–5.2)
ALBUMIN/GLOB SERPL: 1.9 G/DL
ALP SERPL-CCNC: 57 U/L (ref 39–117)
ALT SERPL W P-5'-P-CCNC: 27 U/L (ref 1–33)
ANION GAP SERPL CALCULATED.3IONS-SCNC: 13 MMOL/L (ref 5–15)
AST SERPL-CCNC: 32 U/L (ref 1–32)
B-HCG UR QL: NEGATIVE
BASOPHILS # BLD AUTO: 0.03 10*3/MM3 (ref 0–0.2)
BASOPHILS NFR BLD AUTO: 0.3 % (ref 0–1.5)
BILIRUB SERPL-MCNC: 0.2 MG/DL (ref 0–1.2)
BUN SERPL-MCNC: 15.7 MG/DL (ref 6–20)
BUN/CREAT SERPL: 18.7 (ref 7–25)
CALCIUM SPEC-SCNC: 10 MG/DL (ref 8.6–10.5)
CHLORIDE SERPL-SCNC: 102 MMOL/L (ref 98–107)
CK SERPL-CCNC: 131 U/L (ref 20–180)
CO2 SERPL-SCNC: 23 MMOL/L (ref 22–29)
CREAT SERPL-MCNC: 0.84 MG/DL (ref 0.57–1)
D DIMER PPP FEU-MCNC: 0.36 MCGFEU/ML (ref 0–0.5)
D-LACTATE SERPL-SCNC: 1.5 MMOL/L (ref 0.5–2)
DEPRECATED RDW RBC AUTO: 45.1 FL (ref 37–54)
EGFRCR SERPLBLD CKD-EPI 2021: 90.8 ML/MIN/1.73
EOSINOPHIL # BLD AUTO: 0.01 10*3/MM3 (ref 0–0.4)
EOSINOPHIL NFR BLD AUTO: 0.1 % (ref 0.3–6.2)
ERYTHROCYTE [DISTWIDTH] IN BLOOD BY AUTOMATED COUNT: 13.6 % (ref 12.3–15.4)
GEN 5 1HR TROPONIN T REFLEX: <6 NG/L
GLOBULIN UR ELPH-MCNC: 2.6 GM/DL
GLUCOSE SERPL-MCNC: 108 MG/DL (ref 65–99)
HCT VFR BLD AUTO: 40.2 % (ref 34–46.6)
HGB BLD-MCNC: 13.6 G/DL (ref 12–15.9)
IMM GRANULOCYTES # BLD AUTO: 0.04 10*3/MM3 (ref 0–0.05)
IMM GRANULOCYTES NFR BLD AUTO: 0.4 % (ref 0–0.5)
LYMPHOCYTES # BLD AUTO: 1.29 10*3/MM3 (ref 0.7–3.1)
LYMPHOCYTES NFR BLD AUTO: 14.5 % (ref 19.6–45.3)
MAGNESIUM SERPL-MCNC: 2.1 MG/DL (ref 1.6–2.6)
MCH RBC QN AUTO: 30.6 PG (ref 26.6–33)
MCHC RBC AUTO-ENTMCNC: 33.8 G/DL (ref 31.5–35.7)
MCV RBC AUTO: 90.3 FL (ref 79–97)
MONOCYTES # BLD AUTO: 0.24 10*3/MM3 (ref 0.1–0.9)
MONOCYTES NFR BLD AUTO: 2.7 % (ref 5–12)
NEUTROPHILS NFR BLD AUTO: 7.29 10*3/MM3 (ref 1.7–7)
NEUTROPHILS NFR BLD AUTO: 82 % (ref 42.7–76)
NRBC BLD AUTO-RTO: 0 /100 WBC (ref 0–0.2)
PLATELET # BLD AUTO: 257 10*3/MM3 (ref 140–450)
PMV BLD AUTO: 10.1 FL (ref 6–12)
POTASSIUM SERPL-SCNC: 3.9 MMOL/L (ref 3.5–5.2)
PROT SERPL-MCNC: 7.5 G/DL (ref 6–8.5)
RBC # BLD AUTO: 4.45 10*6/MM3 (ref 3.77–5.28)
SODIUM SERPL-SCNC: 138 MMOL/L (ref 136–145)
TROPONIN T NUMERIC DELTA: NORMAL
TROPONIN T SERPL HS-MCNC: <6 NG/L
WBC NRBC COR # BLD AUTO: 8.9 10*3/MM3 (ref 3.4–10.8)

## 2025-08-15 PROCEDURE — 99284 EMERGENCY DEPT VISIT MOD MDM: CPT | Performed by: FAMILY MEDICINE

## 2025-08-15 PROCEDURE — 83605 ASSAY OF LACTIC ACID: CPT | Performed by: FAMILY MEDICINE

## 2025-08-15 PROCEDURE — 36415 COLL VENOUS BLD VENIPUNCTURE: CPT

## 2025-08-15 PROCEDURE — 81025 URINE PREGNANCY TEST: CPT | Performed by: FAMILY MEDICINE

## 2025-08-15 PROCEDURE — 84484 ASSAY OF TROPONIN QUANT: CPT | Performed by: FAMILY MEDICINE

## 2025-08-15 PROCEDURE — 85025 COMPLETE CBC W/AUTO DIFF WBC: CPT | Performed by: FAMILY MEDICINE

## 2025-08-15 PROCEDURE — 72125 CT NECK SPINE W/O DYE: CPT

## 2025-08-15 PROCEDURE — 85379 FIBRIN DEGRADATION QUANT: CPT | Performed by: FAMILY MEDICINE

## 2025-08-15 PROCEDURE — 93246 EXT ECG>7D<15D RECORDING: CPT

## 2025-08-15 PROCEDURE — 93005 ELECTROCARDIOGRAM TRACING: CPT | Performed by: FAMILY MEDICINE

## 2025-08-15 PROCEDURE — 80053 COMPREHEN METABOLIC PANEL: CPT | Performed by: FAMILY MEDICINE

## 2025-08-15 PROCEDURE — 71045 X-RAY EXAM CHEST 1 VIEW: CPT

## 2025-08-15 PROCEDURE — 82550 ASSAY OF CK (CPK): CPT | Performed by: FAMILY MEDICINE

## 2025-08-15 PROCEDURE — 83735 ASSAY OF MAGNESIUM: CPT | Performed by: FAMILY MEDICINE

## 2025-08-15 RX ORDER — ASPIRIN 81 MG/1
324 TABLET, CHEWABLE ORAL ONCE
Status: COMPLETED | OUTPATIENT
Start: 2025-08-15 | End: 2025-08-15

## 2025-08-15 RX ADMIN — ASPIRIN 81 MG CHEWABLE TABLET 324 MG: 81 TABLET CHEWABLE at 11:58

## 2025-08-16 LAB
QT INTERVAL: 382 MS
QTC INTERVAL: 424 MS

## (undated) DEVICE — SUT  GUT PLAIN 2/0 CT1 27IN 843H

## (undated) DEVICE — SOL NACL 0.9PCT 1000ML

## (undated) DEVICE — SYR LUERLOK 30CC

## (undated) DEVICE — SKIN AFFIX SURG ADHESIVE 72/CS 0.55ML: Brand: MEDLINE

## (undated) DEVICE — MONOPOLAR METZENBAUM SCISSOR, MINI BLADE TIP, DISPOSABLE: Brand: MONOPOLAR METZENBAUM SCISSOR, MINI BLADE TIP, DISPOSABLE

## (undated) DEVICE — 4-PORT MANIFOLD: Brand: NEPTUNE 2

## (undated) DEVICE — DRSNG BRDR MEPILEX P/OP SIL 4X12IN

## (undated) DEVICE — PK CYSTO 30

## (undated) DEVICE — GLV SURG SENSICARE W/ALOE PF LF SZ6 STRL

## (undated) DEVICE — BHS TURNOVER CYSTO: Brand: MEDLINE INDUSTRIES, INC.

## (undated) DEVICE — GLV SURG BIOGEL M LTX PF 7 1/2

## (undated) DEVICE — NDL HYPO PRECISIONGLIDE REG 21G 1 1/2

## (undated) DEVICE — ADHS SKIN PREMIERPRO EXOFIN TOPICAL HI/VISC .5ML

## (undated) DEVICE — FRCP BIPOL CUT 5MM

## (undated) DEVICE — ENDOPOUCH RETRIEVER SPECIMEN RETRIEVAL BAGS: Brand: ENDOPOUCH RETRIEVER

## (undated) DEVICE — SUT PDS 1 TP1 48IN Z880G BX/12

## (undated) DEVICE — TOWEL,OR,DSP,ST,BLUE,STD,4/PK,20PK/CS: Brand: MEDLINE

## (undated) DEVICE — ENDOPATH XCEL WITH OPTIVIEW TECHNOLOGY UNIVERSAL TROCAR STABILITY SLEEVES: Brand: ENDOPATH XCEL OPTIVIEW

## (undated) DEVICE — ENDOPATH PNEUMONEEDLE INSUFFLATION NEEDLES WITH LUER LOCK CONNECTORS 120MM: Brand: ENDOPATH

## (undated) DEVICE — BALLOON DILATATION CATHETER KIT: Brand: UROMAX ULTRA KIT

## (undated) DEVICE — NITINOL STONE RETRIEVAL BASKET: Brand: ZERO TIP

## (undated) DEVICE — INFLATION DEVICE: Brand: ENCORE 26

## (undated) DEVICE — CATH URETRL OPN/END 5F70CM

## (undated) DEVICE — GLV SURG OR CLASSIC PWDR LTX 6.5 STRL

## (undated) DEVICE — APPL CHLORAPREP HI/LITE 26ML ORNG

## (undated) DEVICE — SUT VIC 0 UR6 27IN VCP603H

## (undated) DEVICE — KENDALL SCD EXPRESS SLEEVES, KNEE LENGTH, MEDIUM: Brand: KENDALL SCD

## (undated) DEVICE — ENDOPATH XCEL WITH OPTIVIEW TECHNOLOGY DILATING TIP TROCARS WITH STABILITY SLEEVES: Brand: ENDOPATH XCEL OPTIVIEW

## (undated) DEVICE — PROXIMATE RH ROTATING HEAD SKIN STAPLERS (35 REGULAR) CONTAINS 35 STAINLESS STEEL STAPLES: Brand: PROXIMATE

## (undated) DEVICE — GW SENSR DUALFLEX NITNL STR .038IN 3X150CM

## (undated) DEVICE — SUT MNCRYL 4/0 PS2 27IN UD MCP426H

## (undated) DEVICE — TRAP FLD MINIVAC MEGADYNE 100ML

## (undated) DEVICE — 2, DISPOSABLE SUCTION/IRRIGATOR WITHOUT DISPOSABLE TIP: Brand: STRYKEFLOW

## (undated) DEVICE — APPL CHLORAPREP W/TINT 26ML ORNG

## (undated) DEVICE — ENDOSCOPIC SEAL URO 1 SIZE FITS ALL: Brand: ENDOSCOPIC SEAL

## (undated) DEVICE — ELECTRD BLD EZ CLN MOD XLNG 2.75IN

## (undated) DEVICE — GLV SURG SENSICARE SLT PF LF 6 STRL

## (undated) DEVICE — SOL ANTISTICK CAUTRY ELECTROLUBE LF

## (undated) DEVICE — SUT VIC 0 CT1 36IN J946H

## (undated) DEVICE — ELECTRD L HK EZ CLN 33CM

## (undated) DEVICE — PAD LAP CHOLE: Brand: MEDLINE INDUSTRIES, INC.

## (undated) DEVICE — BAPTIST TURNOVER KIT: Brand: MEDLINE INDUSTRIES, INC.

## (undated) DEVICE — GLV SURG SENSICARE W/ALOE PF LF 6.5 STRL